# Patient Record
Sex: FEMALE | Race: WHITE | Employment: FULL TIME | URBAN - METROPOLITAN AREA
[De-identification: names, ages, dates, MRNs, and addresses within clinical notes are randomized per-mention and may not be internally consistent; named-entity substitution may affect disease eponyms.]

---

## 2016-11-28 PROCEDURE — 93272 ECG/REVIEW INTERPRET ONLY: CPT | Performed by: INTERNAL MEDICINE

## 2017-01-09 ENCOUNTER — TELEPHONE (OUTPATIENT)
Dept: CARDIOLOGY CLINIC | Age: 22
End: 2017-01-09

## 2017-01-09 ENCOUNTER — TELEPHONE (OUTPATIENT)
Dept: FAMILY MEDICINE CLINIC | Age: 22
End: 2017-01-09

## 2017-01-10 ENCOUNTER — OFFICE VISIT (OUTPATIENT)
Dept: CARDIOLOGY CLINIC | Age: 22
End: 2017-01-10

## 2017-01-10 VITALS
SYSTOLIC BLOOD PRESSURE: 92 MMHG | HEIGHT: 65 IN | DIASTOLIC BLOOD PRESSURE: 58 MMHG | BODY MASS INDEX: 23.17 KG/M2 | WEIGHT: 139.1 LBS | HEART RATE: 64 BPM

## 2017-01-10 DIAGNOSIS — R55 SYNCOPE AND COLLAPSE: Primary | ICD-10-CM

## 2017-01-10 PROCEDURE — 99244 OFF/OP CNSLTJ NEW/EST MOD 40: CPT | Performed by: INTERNAL MEDICINE

## 2017-01-10 PROCEDURE — 93000 ELECTROCARDIOGRAM COMPLETE: CPT | Performed by: INTERNAL MEDICINE

## 2017-01-23 DIAGNOSIS — R55 SYNCOPE AND COLLAPSE: ICD-10-CM

## 2017-01-23 DIAGNOSIS — R42 DIZZINESS: ICD-10-CM

## 2017-05-30 ENCOUNTER — PATIENT MESSAGE (OUTPATIENT)
Dept: DERMATOLOGY | Age: 22
End: 2017-05-30

## 2017-07-12 ENCOUNTER — OFFICE VISIT (OUTPATIENT)
Dept: RHEUMATOLOGY | Age: 22
End: 2017-07-12

## 2017-07-12 ENCOUNTER — OFFICE VISIT (OUTPATIENT)
Dept: DERMATOLOGY | Age: 22
End: 2017-07-12

## 2017-07-12 VITALS
HEIGHT: 65 IN | WEIGHT: 139 LBS | DIASTOLIC BLOOD PRESSURE: 62 MMHG | HEART RATE: 73 BPM | TEMPERATURE: 98.3 F | BODY MASS INDEX: 23.16 KG/M2 | SYSTOLIC BLOOD PRESSURE: 98 MMHG

## 2017-07-12 DIAGNOSIS — L20.84 INTRINSIC ATOPIC DERMATITIS: Primary | ICD-10-CM

## 2017-07-12 DIAGNOSIS — Z13.89 SCREENING FOR RHEUMATIC DISORDER: Primary | ICD-10-CM

## 2017-07-12 DIAGNOSIS — R20.2 PARESTHESIA: ICD-10-CM

## 2017-07-12 PROCEDURE — 99244 OFF/OP CNSLTJ NEW/EST MOD 40: CPT | Performed by: INTERNAL MEDICINE

## 2017-07-12 PROCEDURE — 99213 OFFICE O/P EST LOW 20 MIN: CPT | Performed by: DERMATOLOGY

## 2017-07-12 RX ORDER — FLUOCINONIDE 0.5 MG/G
OINTMENT TOPICAL
Qty: 30 G | Refills: 1 | Status: SHIPPED | OUTPATIENT
Start: 2017-07-12 | End: 2019-01-09

## 2017-07-21 ENCOUNTER — OFFICE VISIT (OUTPATIENT)
Dept: FAMILY MEDICINE CLINIC | Age: 22
End: 2017-07-21

## 2017-07-21 VITALS
RESPIRATION RATE: 18 BRPM | OXYGEN SATURATION: 99 % | HEIGHT: 65 IN | WEIGHT: 140.8 LBS | TEMPERATURE: 98.4 F | BODY MASS INDEX: 23.46 KG/M2 | HEART RATE: 66 BPM | DIASTOLIC BLOOD PRESSURE: 66 MMHG | SYSTOLIC BLOOD PRESSURE: 102 MMHG

## 2017-07-21 DIAGNOSIS — M79.2 NEURALGIA: Primary | ICD-10-CM

## 2017-07-21 DIAGNOSIS — R55 SYNCOPE AND COLLAPSE: ICD-10-CM

## 2017-07-21 PROCEDURE — 99213 OFFICE O/P EST LOW 20 MIN: CPT | Performed by: INTERNAL MEDICINE

## 2017-07-21 ASSESSMENT — PATIENT HEALTH QUESTIONNAIRE - PHQ9
1. LITTLE INTEREST OR PLEASURE IN DOING THINGS: 0
2. FEELING DOWN, DEPRESSED OR HOPELESS: 1
SUM OF ALL RESPONSES TO PHQ QUESTIONS 1-9: 1
SUM OF ALL RESPONSES TO PHQ9 QUESTIONS 1 & 2: 1

## 2017-07-21 ASSESSMENT — ENCOUNTER SYMPTOMS
RESPIRATORY NEGATIVE: 1
ALLERGIC/IMMUNOLOGIC NEGATIVE: 1

## 2017-08-03 ENCOUNTER — OFFICE VISIT (OUTPATIENT)
Dept: FAMILY MEDICINE CLINIC | Age: 22
End: 2017-08-03

## 2017-08-03 VITALS
HEIGHT: 65 IN | DIASTOLIC BLOOD PRESSURE: 78 MMHG | HEART RATE: 68 BPM | OXYGEN SATURATION: 93 % | WEIGHT: 139.2 LBS | RESPIRATION RATE: 18 BRPM | SYSTOLIC BLOOD PRESSURE: 102 MMHG | BODY MASS INDEX: 23.19 KG/M2

## 2017-08-03 DIAGNOSIS — M79.2 NEURALGIA: Primary | ICD-10-CM

## 2017-08-03 DIAGNOSIS — R55 SYNCOPE AND COLLAPSE: ICD-10-CM

## 2017-08-03 PROCEDURE — 99213 OFFICE O/P EST LOW 20 MIN: CPT | Performed by: INTERNAL MEDICINE

## 2017-08-03 ASSESSMENT — ENCOUNTER SYMPTOMS
RESPIRATORY NEGATIVE: 1
ALLERGIC/IMMUNOLOGIC NEGATIVE: 1

## 2019-01-09 ENCOUNTER — OFFICE VISIT (OUTPATIENT)
Dept: FAMILY MEDICINE CLINIC | Age: 24
End: 2019-01-09
Payer: COMMERCIAL

## 2019-01-09 VITALS
BODY MASS INDEX: 23.99 KG/M2 | HEART RATE: 94 BPM | WEIGHT: 144 LBS | TEMPERATURE: 97.9 F | RESPIRATION RATE: 18 BRPM | OXYGEN SATURATION: 99 % | SYSTOLIC BLOOD PRESSURE: 112 MMHG | DIASTOLIC BLOOD PRESSURE: 82 MMHG | HEIGHT: 65 IN

## 2019-01-09 DIAGNOSIS — Z00.00 ANNUAL PHYSICAL EXAM: Primary | ICD-10-CM

## 2019-01-09 DIAGNOSIS — Z13.220 SCREENING, LIPID: ICD-10-CM

## 2019-01-09 DIAGNOSIS — R55 SYNCOPE AND COLLAPSE: ICD-10-CM

## 2019-01-09 DIAGNOSIS — F32.89 OTHER DEPRESSION: ICD-10-CM

## 2019-01-09 DIAGNOSIS — M79.10 MYALGIA: ICD-10-CM

## 2019-01-09 LAB
A/G RATIO: 2.2 (ref 1.1–2.2)
ALBUMIN SERPL-MCNC: 5.3 G/DL (ref 3.4–5)
ALP BLD-CCNC: 65 U/L (ref 40–129)
ALT SERPL-CCNC: 19 U/L (ref 10–40)
ANION GAP SERPL CALCULATED.3IONS-SCNC: 15 MMOL/L (ref 3–16)
AST SERPL-CCNC: 18 U/L (ref 15–37)
BASOPHILS ABSOLUTE: 0 K/UL (ref 0–0.2)
BASOPHILS RELATIVE PERCENT: 0.3 %
BILIRUB SERPL-MCNC: 0.4 MG/DL (ref 0–1)
BUN BLDV-MCNC: 14 MG/DL (ref 7–20)
CALCIUM SERPL-MCNC: 9.9 MG/DL (ref 8.3–10.6)
CHLORIDE BLD-SCNC: 101 MMOL/L (ref 99–110)
CHOLESTEROL, TOTAL: 139 MG/DL (ref 0–199)
CO2: 25 MMOL/L (ref 21–32)
CREAT SERPL-MCNC: 0.6 MG/DL (ref 0.6–1.1)
EOSINOPHILS ABSOLUTE: 0.2 K/UL (ref 0–0.6)
EOSINOPHILS RELATIVE PERCENT: 2.7 %
GFR AFRICAN AMERICAN: >60
GFR NON-AFRICAN AMERICAN: >60
GLOBULIN: 2.4 G/DL
GLUCOSE BLD-MCNC: 84 MG/DL (ref 70–99)
HCT VFR BLD CALC: 45.5 % (ref 36–48)
HDLC SERPL-MCNC: 54 MG/DL (ref 40–60)
HEMOGLOBIN: 15 G/DL (ref 12–16)
LDL CHOLESTEROL CALCULATED: 76 MG/DL
LYMPHOCYTES ABSOLUTE: 2.3 K/UL (ref 1–5.1)
LYMPHOCYTES RELATIVE PERCENT: 28.8 %
MCH RBC QN AUTO: 30.4 PG (ref 26–34)
MCHC RBC AUTO-ENTMCNC: 32.9 G/DL (ref 31–36)
MCV RBC AUTO: 92.5 FL (ref 80–100)
MONOCYTES ABSOLUTE: 0.7 K/UL (ref 0–1.3)
MONOCYTES RELATIVE PERCENT: 8.5 %
NEUTROPHILS ABSOLUTE: 4.7 K/UL (ref 1.7–7.7)
NEUTROPHILS RELATIVE PERCENT: 59.7 %
PDW BLD-RTO: 12.7 % (ref 12.4–15.4)
PLATELET # BLD: 245 K/UL (ref 135–450)
PMV BLD AUTO: 9.5 FL (ref 5–10.5)
POTASSIUM SERPL-SCNC: 3.9 MMOL/L (ref 3.5–5.1)
RBC # BLD: 4.92 M/UL (ref 4–5.2)
SODIUM BLD-SCNC: 141 MMOL/L (ref 136–145)
TOTAL PROTEIN: 7.7 G/DL (ref 6.4–8.2)
TRIGL SERPL-MCNC: 43 MG/DL (ref 0–150)
TSH REFLEX FT4: 2.86 UIU/ML (ref 0.27–4.2)
VLDLC SERPL CALC-MCNC: 9 MG/DL
WBC # BLD: 7.9 K/UL (ref 4–11)

## 2019-01-09 PROCEDURE — 99395 PREV VISIT EST AGE 18-39: CPT | Performed by: PHYSICIAN ASSISTANT

## 2019-01-09 PROCEDURE — 36415 COLL VENOUS BLD VENIPUNCTURE: CPT | Performed by: PHYSICIAN ASSISTANT

## 2019-01-09 PROCEDURE — 90686 IIV4 VACC NO PRSV 0.5 ML IM: CPT | Performed by: PHYSICIAN ASSISTANT

## 2019-01-09 PROCEDURE — 90471 IMMUNIZATION ADMIN: CPT | Performed by: PHYSICIAN ASSISTANT

## 2019-01-09 ASSESSMENT — PATIENT HEALTH QUESTIONNAIRE - PHQ9
SUM OF ALL RESPONSES TO PHQ QUESTIONS 1-9: 1
SUM OF ALL RESPONSES TO PHQ9 QUESTIONS 1 & 2: 1
2. FEELING DOWN, DEPRESSED OR HOPELESS: 0
1. LITTLE INTEREST OR PLEASURE IN DOING THINGS: 1
SUM OF ALL RESPONSES TO PHQ QUESTIONS 1-9: 1

## 2020-01-27 ENCOUNTER — OFFICE VISIT (OUTPATIENT)
Dept: DERMATOLOGY | Age: 25
End: 2020-01-27
Payer: COMMERCIAL

## 2020-01-27 PROCEDURE — 99213 OFFICE O/P EST LOW 20 MIN: CPT | Performed by: DERMATOLOGY

## 2020-01-27 PROCEDURE — 11104 PUNCH BX SKIN SINGLE LESION: CPT | Performed by: DERMATOLOGY

## 2020-01-27 PROCEDURE — 11103 TANGNTL BX SKIN EA SEP/ADDL: CPT | Performed by: DERMATOLOGY

## 2020-01-27 RX ORDER — FLUOCINONIDE 0.5 MG/G
OINTMENT TOPICAL
Qty: 30 G | Refills: 1 | Status: SHIPPED | OUTPATIENT
Start: 2020-01-27 | End: 2020-05-28 | Stop reason: SDUPTHER

## 2020-01-27 RX ORDER — FLUOCINONIDE 0.5 MG/G
OINTMENT TOPICAL
Qty: 30 G | Refills: 1 | Status: SHIPPED | OUTPATIENT
Start: 2020-01-27 | End: 2020-01-27 | Stop reason: SDUPTHER

## 2020-01-27 NOTE — PROGRESS NOTES
extending slightly to labia majora - thinly scaling faint pink patches   -Both proximal extensor forearms - few cm well-defined focally excoriated thinly coarsely scaling erythematous plaques     2. Scattered on the trunk and extremities are multiple well-defined round and oval symmetric smoothly-bordered uniformly brown macules and papules. 2a. Mid anterior R lower leg - 4mm centrally tan o/w dark brown thin papule       Assessment and Plan     1. Recurrent pruritic scaling eruption of extremities and genitalia. Previously assessed as dermatitis, however morphology of forearm lesions is more consistent w/ psoriasis.   -Discussed possible diagnosis. Patient agreeable to biopsy. Verbal consent obtained after risks (infection, bleeding, scar), benefits and alternatives explained. -Area(s) to be biopsied were marked with a surgical pen. Site(s) were cleansed with alcohol. Local anesthesia achieved with 1% lidocaine with epinephrine/sodium bicarbonate. 4mm punch biopsy of R elbow lesion was performed followed by placement of simple interrupted 4-0 nylon sutures. Specimen(s) sent to pathology. The wound(s) were dressed with petrolatum and covered with a bandage. Pt was educated re: risk of bleeding, infection, scar, wound care instructions and suture removal.   -Lidex oint bid prn. Edu re: sparing use, atrophy, striae, hypopigmentation, telangiectasias. 2. Benign acquired melanocytic nevi  -Recommend monthly self skin exams   -Educated regarding the ABCDEs of melanoma detection   -Call for any new/changing moles or concerning lesions  -Reviewed sun protective behavior -- sun avoidance during the peak hours of the day, sun-protective clothing (including hat and sunglasses), sunscreen use (water resistant, broad spectrum, SPF at least 30, need for reapplication every 2 to 3 hours), avoidance of tanning beds   -Return for full skin exam in 1 year (sooner if indicated)     2a.  Neoplasm of uncertain behavior of skin -

## 2020-01-27 NOTE — PATIENT INSTRUCTIONS
Protecting Yourself From the Sun    · Apply broad spectrum water resistant sunscreen with an SPF of at least 30 to exposed areas of the skin. Dont forget the ears and lips! Remember to reapply sunscreen about every 2 hours and after swimming or sweating. · Wear sun protective clothing. Swim shirts (aka. rash guards) are a great idea and negates the need to reapply sunscreen in those areas. · Seek the shade whenever possible especially between the hours of 10 am and 4 pm when the suns rays are the strongest.     · Avoid tanning beds       Biopsy Wound Care Instructions    · Keep the bandage in place for 24 hours. · Cleanse the wound with mild soapy water daily   Gently dry the area.  Apply Vaseline or petroleum jelly to the wound using a cotton tipped applicator.  Cover with a clean bandage.  Repeat this process until the biopsy site is healed.  If you had stitches placed, continue treating the site until the stitches are removed. Remember to make an appointment to return to have your stitches removed by our staff.  You may shower and bathe as usual.       ** Biopsy results generally take around 7 business days to come back. If you have not heard from us by then, please call the office at [phone] between 8AM and 4PM Monday through Friday.        ** REMOVE STITCH(ES) IN 14 DAYS **

## 2020-01-29 LAB — DERMATOLOGY PATHOLOGY REPORT: NORMAL

## 2020-02-07 NOTE — PROGRESS NOTES
affect, memory, mentation, or behavior are noted      DATA:    EC/10/2020  SR 79 bpm  ECHO: 3/27/14  Summary   Left ventricle size is normal.   Normal left ventricular wall thickness. Global ejection fraction is normal and estimated from 55 % to 60 %. No regional wall motion abnormalities are noted. Normal diastolic function. Mitral valve is structurally normal.   Trivial mitral regurgitation is present. The aortic valve is normal in structure and function. There is no   significant aortic regurgitation. IVC is normal in size (< 2.1 cm) and collapses > 50% with respiration   consistent with normal RA pressure (3 mmHg). no intracardiac mass, vegetations or thrombi. IMPRESSION:    1. Dizziness   -At least once/month  2. Syncope  ` ~resolved  3. Lower extremity numbness   -ongoing    RECOMMENDATIONS:  1.  ESR, C-reactive Protein, RON to r/o rheumatological reason for her symptoms   2.  2 week monitor to r/ cardiac arrhythmias  3. Keep large glass of water at bedside and drink before you get out of bed. 4.  Referral to Dr. Alonso Ellis  5. Keep log of BP and HR and bring back with you. 6.  Echo  7. Follow up after see Dr. Alonso Ellis in about 6-8 weeks      QUALITY MEASURES  1. Tobacco Cessation Counseling: NA  2. Retake of BP if >140/90:   NA  3. Documentation to PCP/referring for new patient:  Sent to PCP at close of office visit  4. CAD patient on anti-platelet: NA  5. CAD patient on STATIN therapy:  NA  6. Patient with CHF and aFib on anticoagulation:  NA       This note was scribed in the presence of BRII Morris MD by Choco Cardoso RN.     I, Luciano Gary, personally performed the services described in this documentation as scribed by the above signed scribe in my presence, and it is both accurate and complete to the best of our ability and knowledge.  I agree with the details independently gathered by my clinical support staff, while the remaining scribed note accurately describes my personal

## 2020-02-10 ENCOUNTER — HOSPITAL ENCOUNTER (OUTPATIENT)
Age: 25
Discharge: HOME | End: 2020-02-10
Payer: COMMERCIAL

## 2020-02-10 ENCOUNTER — OFFICE VISIT (OUTPATIENT)
Dept: CARDIOLOGY CLINIC | Age: 25
End: 2020-02-10
Payer: COMMERCIAL

## 2020-02-10 VITALS
WEIGHT: 158 LBS | SYSTOLIC BLOOD PRESSURE: 126 MMHG | DIASTOLIC BLOOD PRESSURE: 78 MMHG | OXYGEN SATURATION: 99 % | HEART RATE: 88 BPM | BODY MASS INDEX: 26.33 KG/M2 | HEIGHT: 65 IN

## 2020-02-10 LAB
C-REACTIVE PROTEIN: 3.3 MG/L (ref 0–5.1)
SEDIMENTATION RATE, ERYTHROCYTE: 5 MM/HR (ref 0–20)

## 2020-02-10 PROCEDURE — 99244 OFF/OP CNSLTJ NEW/EST MOD 40: CPT | Performed by: INTERNAL MEDICINE

## 2020-02-10 PROCEDURE — 85652 RBC SED RATE AUTOMATED: CPT

## 2020-02-10 PROCEDURE — 93000 ELECTROCARDIOGRAM COMPLETE: CPT | Performed by: INTERNAL MEDICINE

## 2020-02-10 PROCEDURE — 86140 C-REACTIVE PROTEIN: CPT

## 2020-02-10 PROCEDURE — 36415 COLL VENOUS BLD VENIPUNCTURE: CPT

## 2020-02-10 PROCEDURE — 86038 ANTINUCLEAR ANTIBODIES: CPT

## 2020-02-11 LAB — ANTI-NUCLEAR ANTIBODY (ANA): NEGATIVE

## 2020-02-12 ENCOUNTER — TELEPHONE (OUTPATIENT)
Dept: CARDIOLOGY CLINIC | Age: 25
End: 2020-02-12

## 2020-02-19 ENCOUNTER — TELEPHONE (OUTPATIENT)
Dept: CARDIOLOGY CLINIC | Age: 25
End: 2020-02-19

## 2020-02-19 ENCOUNTER — HOSPITAL ENCOUNTER (OUTPATIENT)
Dept: CARDIOLOGY | Age: 25
Discharge: HOME | End: 2020-02-19
Payer: COMMERCIAL

## 2020-02-19 LAB
LV EF: 55 %
LVEF MODALITY: NORMAL

## 2020-02-19 PROCEDURE — 93306 TTE W/DOPPLER COMPLETE: CPT

## 2020-02-19 PROCEDURE — 0296T PR EXT ECG > 48HR TO 21 DAY RCRD W/CONECT INTL RCRD: CPT | Performed by: INTERNAL MEDICINE

## 2020-02-24 ENCOUNTER — TELEPHONE (OUTPATIENT)
Dept: CARDIOLOGY CLINIC | Age: 25
End: 2020-02-24

## 2020-02-24 NOTE — TELEPHONE ENCOUNTER
----- Message from Duane Glass MD sent at 2/24/2020 12:05 PM EST -----  Please inform patient.  Echo normal

## 2020-02-27 ENCOUNTER — HOSPITAL ENCOUNTER (OUTPATIENT)
Age: 25
Discharge: HOME | End: 2020-02-27
Payer: COMMERCIAL

## 2020-02-27 LAB
A/G RATIO: 1.9 (ref 1.1–2.2)
ALBUMIN SERPL-MCNC: 5 G/DL (ref 3.4–5)
ALP BLD-CCNC: 56 U/L (ref 40–129)
ALT SERPL-CCNC: 23 U/L (ref 10–40)
ANION GAP SERPL CALCULATED.3IONS-SCNC: 14 MMOL/L (ref 3–16)
AST SERPL-CCNC: 20 U/L (ref 15–37)
BASOPHILS ABSOLUTE: 0 K/UL (ref 0–0.2)
BASOPHILS RELATIVE PERCENT: 0.5 %
BILIRUB SERPL-MCNC: 0.5 MG/DL (ref 0–1)
BUN BLDV-MCNC: 12 MG/DL (ref 7–20)
CALCIUM SERPL-MCNC: 9.7 MG/DL (ref 8.3–10.6)
CHLORIDE BLD-SCNC: 98 MMOL/L (ref 99–110)
CO2: 26 MMOL/L (ref 21–32)
CREAT SERPL-MCNC: 0.5 MG/DL (ref 0.6–1.1)
EOSINOPHILS ABSOLUTE: 0.2 K/UL (ref 0–0.6)
EOSINOPHILS RELATIVE PERCENT: 2.7 %
GFR AFRICAN AMERICAN: >60
GFR NON-AFRICAN AMERICAN: >60
GLOBULIN: 2.7 G/DL
GLUCOSE BLD-MCNC: 79 MG/DL (ref 70–99)
HCT VFR BLD CALC: 41.5 % (ref 36–48)
HEMOGLOBIN: 14 G/DL (ref 12–16)
LYMPHOCYTES ABSOLUTE: 2 K/UL (ref 1–5.1)
LYMPHOCYTES RELATIVE PERCENT: 24 %
MCH RBC QN AUTO: 31 PG (ref 26–34)
MCHC RBC AUTO-ENTMCNC: 33.8 G/DL (ref 31–36)
MCV RBC AUTO: 91.7 FL (ref 80–100)
MONOCYTES ABSOLUTE: 0.7 K/UL (ref 0–1.3)
MONOCYTES RELATIVE PERCENT: 8 %
NEUTROPHILS ABSOLUTE: 5.5 K/UL (ref 1.7–7.7)
NEUTROPHILS RELATIVE PERCENT: 64.8 %
PDW BLD-RTO: 12.7 % (ref 12.4–15.4)
PLATELET # BLD: 234 K/UL (ref 135–450)
PMV BLD AUTO: 9.4 FL (ref 5–10.5)
POTASSIUM SERPL-SCNC: 3.7 MMOL/L (ref 3.5–5.1)
RBC # BLD: 4.53 M/UL (ref 4–5.2)
RHEUMATOID FACTOR: <10 IU/ML
SODIUM BLD-SCNC: 138 MMOL/L (ref 136–145)
TOTAL CK: 83 U/L (ref 26–192)
TOTAL PROTEIN: 7.7 G/DL (ref 6.4–8.2)
TSH REFLEX FT4: 1.89 UIU/ML (ref 0.27–4.2)
WBC # BLD: 8.5 K/UL (ref 4–11)

## 2020-02-27 PROCEDURE — 36415 COLL VENOUS BLD VENIPUNCTURE: CPT

## 2020-02-27 PROCEDURE — 80053 COMPREHEN METABOLIC PANEL: CPT

## 2020-02-27 PROCEDURE — 82164 ANGIOTENSIN I ENZYME TEST: CPT

## 2020-02-27 PROCEDURE — 85025 COMPLETE CBC W/AUTO DIFF WBC: CPT

## 2020-02-27 PROCEDURE — 86255 FLUORESCENT ANTIBODY SCREEN: CPT

## 2020-02-27 PROCEDURE — 86431 RHEUMATOID FACTOR QUANT: CPT

## 2020-02-27 PROCEDURE — 82550 ASSAY OF CK (CPK): CPT

## 2020-02-27 PROCEDURE — 84443 ASSAY THYROID STIM HORMONE: CPT

## 2020-02-29 LAB
ANCA IFA: NORMAL
ANGIOTENSIN CONVERTING ENZYME: 41 U/L (ref 9–67)

## 2020-03-20 ENCOUNTER — HOSPITAL ENCOUNTER (OUTPATIENT)
Age: 25
Discharge: HOME | End: 2020-03-20
Payer: COMMERCIAL

## 2020-03-20 LAB
HEPATITIS B CORE IGM ANTIBODY: NORMAL
HEPATITIS B SURFACE ANTIGEN INTERPRETATION: NORMAL
HEPATITIS C ANTIBODY INTERPRETATION: NORMAL

## 2020-03-20 PROCEDURE — 86803 HEPATITIS C AB TEST: CPT

## 2020-03-20 PROCEDURE — 84165 PROTEIN E-PHORESIS SERUM: CPT

## 2020-03-20 PROCEDURE — 84207 ASSAY OF VITAMIN B-6: CPT

## 2020-03-20 PROCEDURE — 86235 NUCLEAR ANTIGEN ANTIBODY: CPT

## 2020-03-20 PROCEDURE — 86701 HIV-1ANTIBODY: CPT

## 2020-03-20 PROCEDURE — 82175 ASSAY OF ARSENIC: CPT

## 2020-03-20 PROCEDURE — 86705 HEP B CORE ANTIBODY IGM: CPT

## 2020-03-20 PROCEDURE — 83519 RIA NONANTIBODY: CPT

## 2020-03-20 PROCEDURE — 83516 IMMUNOASSAY NONANTIBODY: CPT

## 2020-03-20 PROCEDURE — 36415 COLL VENOUS BLD VENIPUNCTURE: CPT

## 2020-03-20 PROCEDURE — 87390 HIV-1 AG IA: CPT

## 2020-03-20 PROCEDURE — 87340 HEPATITIS B SURFACE AG IA: CPT

## 2020-03-20 PROCEDURE — 86702 HIV-2 ANTIBODY: CPT

## 2020-03-20 PROCEDURE — 83825 ASSAY OF MERCURY: CPT

## 2020-03-20 PROCEDURE — 83655 ASSAY OF LEAD: CPT

## 2020-03-20 PROCEDURE — 84155 ASSAY OF PROTEIN SERUM: CPT

## 2020-03-21 LAB
ANTI-SS-A IGG: <0.2 AI (ref 0–0.9)
ANTI-SS-B IGG: <0.2 AI (ref 0–0.9)
HIV AG/AB: NORMAL
HIV ANTIGEN: NORMAL
HIV-1 ANTIBODY: NORMAL
HIV-2 AB: NORMAL

## 2020-03-22 LAB
ARSENIC BLOOD: <10 UG/L (ref 0–12)
LEAD LEVEL BLOOD: <2 UG/DL (ref 0–4.9)
MERCURY BLOOD: <2.5 UG/L (ref 0–10)

## 2020-03-23 ENCOUNTER — TELEPHONE (OUTPATIENT)
Dept: CARDIOLOGY CLINIC | Age: 25
End: 2020-03-23

## 2020-03-23 LAB
ALBUMIN SERPL-MCNC: 4.1 G/DL (ref 3.1–4.9)
ALPHA-1-GLOBULIN: 0.3 G/DL (ref 0.2–0.4)
ALPHA-2-GLOBULIN: 0.9 G/DL (ref 0.4–1.1)
BETA GLOBULIN: 1.1 G/DL (ref 0.9–1.6)
GAMMA GLOBULIN: 1.3 G/DL (ref 0.6–1.8)
MISCELLANEOUS LAB TEST ORDER: NORMAL
SPE/IFE INTERPRETATION: NORMAL
TOTAL PROTEIN: 7.7 G/DL (ref 6.4–8.2)
VITAMIN B6: 104.7 NMOL/L (ref 20–125)

## 2020-03-23 PROCEDURE — 0298T PR EXT ECG > 48HR TO 21 DAY REVIEW AND INTERPRETATN: CPT | Performed by: INTERNAL MEDICINE

## 2020-03-23 NOTE — TELEPHONE ENCOUNTER
Monitor most consistent inappropriate sinus tachycardia based on our discussion. I don't feel comfortable starting her on therapy without an exam at this point. She is following up with her neurologist and will follow up in future if she continues to be symptomatic.

## 2020-03-25 ENCOUNTER — TELEPHONE (OUTPATIENT)
Dept: CARDIOLOGY CLINIC | Age: 25
End: 2020-03-25

## 2020-04-06 LAB
Lab: NORMAL
REPORT: NORMAL
THIS TEST SENT TO: NORMAL

## 2020-04-29 NOTE — PROGRESS NOTES
[facility]   Electrophysiology Note      Reason for office visit: Follow-up; Palpitations; Shortness of Breath (on exertion); and Dizziness      HISTORY OF PRESENT ILLNESS: Emily Hickey is a 26 y.o. female who presents for follow up for dizziness. She had seen Dr. Giuliana Lay on 1/10/2017 for syncope and her metoprolol was cut back to 12.5mg BID. She then saw Dr. Dona Huang on 7/21/17 and he stopped the metoprolol and she reported that her symptoms resolved 3-4 days. She had a normal echo on 2/24/2020. Her cardiac event monitor from 3/2020 was consistent with inappropriate sinus tachycardia. She is following Dr. Greg Helton for Raynaud phenomenon. She underwent a tilt table test on 3/21/2020. Today she states that her neurologist diagnosed her with small fiber neuropathy. EKG today shows SR 80 bpm.  She likes to hike, write, and photography. She feels fatigue, leg numbness, feeling like she is going to pass out, and nausea with her neuropathy. She was diagnosed with POTS when she was 17. She gets dizzy when she gets up from lying or sitting. She gets this multiple times a week. She states that she has a little bit of depression but it is controlled and she is not taking anything for it. She denies smoking or illicit drugs. She does drink alcohol occasionally. Past Medical History:   has a past medical history of Depression, Syncope and collapse, and Syncope and collapse. Past Surgical History:   has a past surgical history that includes [place] tooth extraction (07/2016). Social History:   reports that she has never smoked. She has never used smokeless tobacco. She reports current alcohol use. She reports that she does not use drugs.      Family History: family history includes Asthma in her brother and father; Diabetes in her paternal grandfather; High Blood Pressure in her maternal grandfather and maternal grandmother; High Cholesterol in her maternal grandfather and maternal No regional wall motion abnormalities are seen. Normal left ventricular diastolic filling pressure. No significant valvular heart disease noted. IMPRESSION:    1. Sinus tachycardia  She is a pleasant 60-year-old female with a medical history significant for symptomatic supraventricular tachycardia. She previously had seen Dr. Celestino Ellsworth and was diagnosed with POTS at age 13 however her tilt table test was negative. She has had symptoms with metoprolol including numbness in her legs. Her new showed sinus tachycardia. She states that during these episodes she is actually not active. We discussed ablation versus medical therapy. I explained to her that ablation for these tachycardias is extremely difficult if her true diagnosis is true and true inappropriate sinus tachycardia I would recommend highly that we start with medications and start with a beta-blocker. Although she has had issues with metoprolol in the past but I will start her on propranolol which may help with her anxiety.  - Start metoprolol 80 mg daily.  - Follow up in 3-4 weeks with virtual visit or telephone to see how she's doing with medication. 2.  Dizziness  - Plan as per above. RECOMMENDATIONS:  1. Ablation benifis and risks discussed. 2.  Propanolol can cause insomnia and decreased libido  3. Start propanolol LA 80 mg daily  4. Follow up in 3-4 weeks with a virtual visit. Call sooner if any problems with medication    QUALITY MEASURES  1. Tobacco Cessation Counseling: NA  2. Retake of BP if >140/90:   NA  3. Documentation to PCP/referring for new patient:  Sent to PCP at close of office visit  4. CAD patient on anti-platelet: NA  5. CAD patient on STATIN therapy:  NA  6. Patient with CHF and aFib on anticoagulation:  NA     All questions and concerns were addressed to the patient/family. Alternatives to my treatment were discussed. This note was scribed in the presence of Sneha Cuevas MD by Miguelito Boyle RN.       Isma Ruano MD  Electrophysiology  [address] [facility]. [address]. [place] [address]  Phone: [phone]  Fax: [phone]   5/5/2020     NOTE: This report was transcribed using voice recognition software. Every effort was made to ensure accuracy, however, inadvertent computerized transcription errors may be present. The scribe's documentation has been prepared under my direction and personally reviewed by me in its entirety. I confirm that the note above accurately reflects all work, physical examination, the discussion of treatments and procedures, and medical decision making performed by me. Ellen Jarrett MD personally performed the services described in this documentation as scribed by nurse in my presence, and is both accurate and complete.     Electronically signed by Princess Tavon MD on 5/5/2020 at 5:05 PM

## 2020-05-01 ENCOUNTER — TELEPHONE (OUTPATIENT)
Dept: CARDIOLOGY CLINIC | Age: 25
End: 2020-05-01

## 2020-05-01 RX ORDER — GABAPENTIN 100 MG/1
100 CAPSULE ORAL PRN
COMMUNITY

## 2020-05-05 ENCOUNTER — TELEPHONE (OUTPATIENT)
Dept: CARDIOLOGY CLINIC | Age: 25
End: 2020-05-05

## 2020-05-05 ENCOUNTER — OFFICE VISIT (OUTPATIENT)
Dept: CARDIOLOGY CLINIC | Age: 25
End: 2020-05-05
Payer: COMMERCIAL

## 2020-05-05 VITALS
WEIGHT: 156 LBS | BODY MASS INDEX: 25.99 KG/M2 | DIASTOLIC BLOOD PRESSURE: 60 MMHG | OXYGEN SATURATION: 99 % | HEART RATE: 87 BPM | HEIGHT: 65 IN | SYSTOLIC BLOOD PRESSURE: 94 MMHG

## 2020-05-05 PROCEDURE — 93000 ELECTROCARDIOGRAM COMPLETE: CPT | Performed by: INTERNAL MEDICINE

## 2020-05-05 PROCEDURE — 99214 OFFICE O/P EST MOD 30 MIN: CPT | Performed by: INTERNAL MEDICINE

## 2020-05-05 RX ORDER — PROPRANOLOL HYDROCHLORIDE 80 MG/1
80 CAPSULE, EXTENDED RELEASE ORAL DAILY
Qty: 30 CAPSULE | Refills: 5 | Status: SHIPPED | OUTPATIENT
Start: 2020-05-05 | End: 2020-06-16 | Stop reason: SDUPTHER

## 2020-05-28 ENCOUNTER — VIRTUAL VISIT (OUTPATIENT)
Dept: DERMATOLOGY | Age: 25
End: 2020-05-28
Payer: COMMERCIAL

## 2020-05-28 PROCEDURE — 99213 OFFICE O/P EST LOW 20 MIN: CPT | Performed by: DERMATOLOGY

## 2020-05-28 RX ORDER — PIMECROLIMUS 10 MG/G
CREAM TOPICAL
Qty: 30 G | Refills: 1 | Status: SHIPPED | OUTPATIENT
Start: 2020-05-28

## 2020-05-28 RX ORDER — FLUOCINONIDE 0.5 MG/G
OINTMENT TOPICAL
Qty: 30 G | Refills: 1 | Status: SHIPPED | OUTPATIENT
Start: 2020-05-28 | End: 2020-11-23 | Stop reason: SDUPTHER

## 2020-06-15 NOTE — PROGRESS NOTES
2020    TELEHEALTH EVALUATION -- Audio (During VNSYT-47 public health emergency)    HPI: Fercho Rodriguez is a 26 y.o. female who presents for follow up for dizziness. She had seen Dr. Viviana Rudolph on 1/10/2017 for syncope and her metoprolol was cut back to 12.5mg BID. She then saw Dr. Beverley Gomez on 17 and he stopped the metoprolol and she reported that her symptoms resolved 3-4 days. She had a normal echo on 2020. Her cardiac event monitor from 3/2020 was consistent with inappropriate sinus tachycardia. She is following Dr. Spenser Cameron for Raynaud phenomenon. She underwent a tilt table test on 3/21/2020. Fercho Rodriguez (:  1995) has requested an audio evaluation for the following concern(s):    Today she states that she has been doing good. She has been exercising daily. She states the Inderal is helping tremendously. Patient denies chest pain, sob, palpitations, dizziness or syncope. She is taking it as prescribed and tolerating it well. Review of Systems    Prior to Visit Medications    Medication Sig Taking? Authorizing Provider   pimecrolimus (ELIDEL) 1 % cream Apply to affected \"sensitive\" areas bid prn flares. Yes Gill Anthony MD   fluocinonide (LIDEX) 0.05 % ointment Apply sparingly to affected area(s) bid prn for flares, up to 2 weeks at a time on any given area. Do not apply on cleared skin. Yes Gill Anthony MD   propranolol (INDERAL LA) 80 MG extended release capsule Take 1 capsule by mouth daily Yes Tian Addison MD   gabapentin (NEURONTIN) 100 MG capsule Take 100 mg by mouth 3 times daily.  prn Yes Historical Provider, MD   levonorgestrel (MIRENA) 20 MCG/24HR IUD by Intrauterine route Yes Historical Provider, MD       Social History     Tobacco Use    Smoking status: Never Smoker    Smokeless tobacco: Never Used   Substance Use Topics    Alcohol use: Yes     Comment: on weekends    Drug use: No        Past Medical History:   Diagnosis Date    Depression     sees therapist 2x per month (1/2019)    Syncope and collapse     Syncope and collapse      ASSESSMENT/PLAN:  1. Sinus tachycardia (inappropriate). 05/05/2020  She is a pleasant 30-year-old female with a medical history significant for symptomatic supraventricular tachycardia. She previously had seen Dr. Patricia Molina and was diagnosed with POTS at age 13 however her tilt table test was negative. She has had symptoms with metoprolol including numbness in her legs. Her new showed sinus tachycardia. She states that during these episodes she is actually not active. We discussed ablation versus medical therapy. I explained to her that ablation for these tachycardias is extremely difficult if her true diagnosis is true and true inappropriate sinus tachycardia I would recommend highly that we start with medications and start with a beta-blocker. Although she has had issues with metoprolol in the past but I will start her on propranolol which may help with her anxiety. 06/16/2020  Patient doing well. Tolerating the metoprolol well. She is exercising again and feels that her energy has significantly improved. She is no longer short of breath or dizzy. We will plan on continuing her current course. Have her follow-up wi NtP in 1 year or as needed. Questions concerns addressed. - Continue propanolol.  - Follow up in 1 year or PRN with me or NP.     2.  Dizziness  - Plan as per above. Natalie Kaur is a 26 y.o. female being evaluated by a telephone Visit  encounter to address concerns as mentioned above. A caregiver was present when appropriate. Due to this being a TeleHealth encounter (During [place] [address] public health emergency), evaluation of the following organ systems was limited: Vitals/Constitutional/EENT/Resp/CV/GI//MS/Neuro/Skin/Heme-Lymph-Imm.   Pursuant to the emergency declaration under the [address] [facility] [place], [address] waiver authority and the [employer]

## 2020-06-16 ENCOUNTER — VIRTUAL VISIT (OUTPATIENT)
Dept: CARDIOLOGY CLINIC | Age: 25
End: 2020-06-16
Payer: COMMERCIAL

## 2020-06-16 PROCEDURE — 99442 PR PHYS/QHP TELEPHONE EVALUATION 11-20 MIN: CPT | Performed by: INTERNAL MEDICINE

## 2020-06-16 RX ORDER — PROPRANOLOL HYDROCHLORIDE 80 MG/1
80 CAPSULE, EXTENDED RELEASE ORAL DAILY
Qty: 90 CAPSULE | Refills: 3 | Status: SHIPPED | OUTPATIENT
Start: 2020-06-16 | End: 2020-11-20 | Stop reason: SDUPTHER

## 2020-08-03 ENCOUNTER — OFFICE VISIT (OUTPATIENT)
Dept: ORTHOPEDIC SURGERY | Age: 25
End: 2020-08-03
Payer: COMMERCIAL

## 2020-08-03 VITALS — BODY MASS INDEX: 24.99 KG/M2 | HEIGHT: 65 IN | WEIGHT: 150 LBS

## 2020-08-03 PROCEDURE — 99243 OFF/OP CNSLTJ NEW/EST LOW 30: CPT | Performed by: ORTHOPAEDIC SURGERY

## 2020-08-03 NOTE — PROGRESS NOTES
Chief Complaint    Hip Pain (bilat hip snapping for along time, while hiking hip snapped and causing her knee to hurt, catching)      History of Present Illness:  Marti Rhodes is a 26 y.o. female presents with acute on chronic left lateral hip pain and right knee pain. She has had what she describes a snapping over the lateral hip which she states that her hip is dislocating. Is been happening for the last several years. She had an episode while she was hiking where she could not get it back in. She also has pain on the lateral aspect of her right knee. She has no specific injury on this area but hurts going up and down stairs, getting up out of a seated squatted position. She saw her primary care physician, Dr. Ross Quick who sends her in today for orthopedic consultation.      Pain Assessment  Location of Pain: Knee  Location Modifiers: Right  Severity of Pain: 8  Frequency of Pain: Intermittent  Aggravating Factors: Stairs  Limiting Behavior: Some  Result of Injury: No  Work-Related Injury: No  Are there other pain locations you wish to document?: No]      Medical History:  Past Medical History:   Diagnosis Date    Depression     sees therapist 2x per month (1/2019)    Syncope and collapse     Syncope and collapse      Patient Active Problem List    Diagnosis Date Noted    Neuralgia 07/21/2017    Strep pharyngitis 12/16/2015    Mononucleosis 12/16/2015    Sore throat and laryngitis 11/28/2014    Fatigue 03/21/2014    Dizziness 03/12/2014    Syncope and collapse 03/10/2014     Past Surgical History:   Procedure Laterality Date    WISDOM TOOTH EXTRACTION  07/2016     Family History   Problem Relation Age of Onset    Other Mother     Rheumatologic Disease Mother     Mental Illness Father     Asthma Father     High Cholesterol Maternal Grandfather     High Blood Pressure Maternal Grandfather     Other Paternal Uncle     Asthma Brother     High Blood Pressure Maternal Grandmother     High Cholesterol Maternal Grandmother     Rheum Arthritis Maternal Grandmother     Diabetes Paternal Grandfather      Social History     Socioeconomic History    Marital status: Single     Spouse name: None    Number of children: None    Years of education: None    Highest education level: None   Occupational History    Occupation: advertising   Social Needs    Financial resource strain: None    Food insecurity     Worry: None     Inability: None    Transportation needs     Medical: None     Non-medical: None   Tobacco Use    Smoking status: Never Smoker    Smokeless tobacco: Never Used   Substance and Sexual Activity    Alcohol use: Yes     Comment: on weekends    Drug use: No    Sexual activity: None   Lifestyle    Physical activity     Days per week: None     Minutes per session: None    Stress: None   Relationships    Social connections     Talks on phone: None     Gets together: None     Attends Hinduism service: None     Active member of club or organization: None     Attends meetings of clubs or organizations: None     Relationship status: None    Intimate partner violence     Fear of current or ex partner: None     Emotionally abused: None     Physically abused: None     Forced sexual activity: None   Other Topics Concern    None   Social History Narrative    None     Current Outpatient Medications   Medication Sig Dispense Refill    propranolol (INDERAL LA) 80 MG extended release capsule Take 1 capsule by mouth daily 90 capsule 3    pimecrolimus (ELIDEL) 1 % cream Apply to affected \"sensitive\" areas bid prn flares. 30 g 1    fluocinonide (LIDEX) 0.05 % ointment Apply sparingly to affected area(s) bid prn for flares, up to 2 weeks at a time on any given area. Do not apply on cleared skin. 30 g 1    gabapentin (NEURONTIN) 100 MG capsule Take 100 mg by mouth 3 times daily.  prn      levonorgestrel (MIRENA) 20 MCG/24HR IUD by Intrauterine route       No current facility-administered medications for this visit. Current Outpatient Medications on File Prior to Visit   Medication Sig Dispense Refill    propranolol (INDERAL LA) 80 MG extended release capsule Take 1 capsule by mouth daily 90 capsule 3    pimecrolimus (ELIDEL) 1 % cream Apply to affected \"sensitive\" areas bid prn flares. 30 g 1    fluocinonide (LIDEX) 0.05 % ointment Apply sparingly to affected area(s) bid prn for flares, up to 2 weeks at a time on any given area. Do not apply on cleared skin. 30 g 1    gabapentin (NEURONTIN) 100 MG capsule Take 100 mg by mouth 3 times daily. prn      levonorgestrel (MIRENA) 20 MCG/24HR IUD by Intrauterine route       No current facility-administered medications on file prior to visit. Allergies   Allergen Reactions    Amoxicillin     Eucrisa [Crisaborole] Other (See Comments)     Caused severe burning reaction.  Metoprolol      Feet felt like they were burning       Review of Systems:  Relevant review of systems reviewed and available in the patient's chart    Vital Signs: There were no vitals filed for this visit. General Exam:   Constitutional: Patient is adequately groomed with no evidence of malnutrition  Mental Status: The patient is oriented to time, place and person. The patient's mood and affect are appropriate. Lymphatic: The lymphatic examination bilaterally reveals all areas to be without enlargement or induration. Vascular: Examination reveals no swelling or calf tenderness. Peripheral pulses are palpable and 2+. Neurological: The patient has good coordination. There is no weakness or sensory deficit.     Left hip Examination:    Inspection:  No erythema swelling or signs of infection    Palpation:  Tenderness to palpation of the lateral aspect over the greater trochanter    Range of Motion:  Full range of motion but does have some discomfort laterally at the hip    Strength:  5/5 hip flexion and abduction and adduction    Special Tests:  Positive Khalif's test. Negative logroll maneuver    Skin: There are no rashes, ulcerations or lesions. Gait: Normal    Reflex 2+ patellar    Additional Comments:       Additional Examinations:         Right Lower Extremity: Examination of the right lower extremity does not show any tenderness, deformity or injury. Range of motion is unremarkable. There is no gross instability. There are no rashes, ulcerations or lesions. Strength and tone are normal.    Radiology:     X-rays obtained and reviewed in office:  Views 3-4  Location left hip  Impression well-maintained articular cartilage space with no evidence of fracture or dislocation. Views:4  Location right knee  Impression: Well-maintained articular cartilage with no evidence of acute fracture, dislocation. Assessment : Left hip bursitis, snapping IT band syndrome  Right knee iliotibial band syndrome at the knee, rule out lateral meniscus tear    Impression:  Encounter Diagnoses   Name Primary?  Pain of both hip joints Yes    Right knee pain, unspecified chronicity     It band syndrome, unspecified laterality     Patellofemoral syndrome of right knee     Snapping hip syndrome, left        Office Procedures:  Orders Placed This Encounter   Procedures    XR HIP 3-4 VW W PELVIS BILATERAL     Standing Status:   Future     Number of Occurrences:   1     Standing Expiration Date:   8/3/2021    XR KNEE RIGHT (MIN 4 VIEWS)     Standing Status:   Future     Number of Occurrences:   1     Standing Expiration Date:   8/3/2021   Neil Arita PT - [facility]     Referral Priority:   Routine     Referral Type:   Eval and Treat     Referral Reason:   Specialty Services Required     Requested Specialty:   Physical Therapy     Number of Visits Requested:   1       Treatment Plan: We reviewed the diagnosis and treatment options. We recommended a course of therapeutic exercises and anti-inflammatory medications.   She lives in [place] and will do a virtual visit in 3 to 4 weeks for repeat clinical exam.  If she continues to have pain, we recommended looking into an MRI probably of the knee.

## 2020-08-31 ENCOUNTER — HOSPITAL ENCOUNTER (OUTPATIENT)
Age: 25
Discharge: HOME | End: 2020-08-31
Payer: COMMERCIAL

## 2020-08-31 PROCEDURE — 83036 HEMOGLOBIN GLYCOSYLATED A1C: CPT

## 2020-08-31 PROCEDURE — 82607 VITAMIN B-12: CPT

## 2020-08-31 PROCEDURE — 36415 COLL VENOUS BLD VENIPUNCTURE: CPT

## 2020-08-31 PROCEDURE — 83540 ASSAY OF IRON: CPT

## 2020-08-31 PROCEDURE — 83921 ORGANIC ACID SINGLE QUANT: CPT

## 2020-08-31 PROCEDURE — 82728 ASSAY OF FERRITIN: CPT

## 2020-08-31 PROCEDURE — 83550 IRON BINDING TEST: CPT

## 2020-09-01 LAB
ESTIMATED AVERAGE GLUCOSE: 93.9 MG/DL
FERRITIN: 155.4 NG/ML (ref 15–150)
HBA1C MFR BLD: 4.9 %
IRON SATURATION: 30 % (ref 15–50)
IRON: 107 UG/DL (ref 37–145)
TOTAL IRON BINDING CAPACITY: 353 UG/DL (ref 260–445)
VITAMIN B-12: 321 PG/ML (ref 211–911)

## 2020-09-03 LAB — METHYLMALONIC ACID: 0.14 UMOL/L (ref 0–0.4)

## 2020-11-20 RX ORDER — PROPRANOLOL HYDROCHLORIDE 80 MG/1
80 CAPSULE, EXTENDED RELEASE ORAL DAILY
Qty: 90 CAPSULE | Refills: 3 | Status: SHIPPED | OUTPATIENT
Start: 2020-11-20 | End: 2020-11-25 | Stop reason: SDUPTHER

## 2020-11-20 NOTE — TELEPHONE ENCOUNTER
Kun Worthington calling from [facility] home order delivery states patient wants refill on  medication fluocinonide 0.05% ointment pls return call back @ [phone] to discuss

## 2020-11-20 NOTE — TELEPHONE ENCOUNTER
Refill on propranolol (INDERAL LA) 80 MG extended release capsule     Send to new pharmacy [address] [place] [address] ph. [phone]  Fax [phone]

## 2020-11-23 RX ORDER — FLUOCINONIDE 0.5 MG/G
OINTMENT TOPICAL
Qty: 30 G | Refills: 1 | Status: SHIPPED | OUTPATIENT
Start: 2020-11-23

## 2020-11-25 RX ORDER — PROPRANOLOL HYDROCHLORIDE 80 MG/1
80 CAPSULE, EXTENDED RELEASE ORAL DAILY
Qty: 90 CAPSULE | Refills: 1 | Status: SHIPPED | OUTPATIENT
Start: 2020-11-25 | End: 2021-07-06

## 2020-11-25 NOTE — TELEPHONE ENCOUNTER
Pharmacy calling. The Propranolol was sent to the wrong pharmacy. It should have gone to Send to new [address]. [phone]  Fax [phone].

## 2021-01-06 ENCOUNTER — TELEPHONE (OUTPATIENT)
Dept: FAMILY MEDICINE CLINIC | Age: 26
End: 2021-01-06

## 2021-01-11 ENCOUNTER — OFFICE VISIT (OUTPATIENT)
Dept: FAMILY MEDICINE CLINIC | Age: 26
End: 2021-01-11
Payer: COMMERCIAL

## 2021-01-11 ENCOUNTER — HOSPITAL ENCOUNTER (OUTPATIENT)
Age: 26
Discharge: HOME | End: 2021-01-11
Payer: COMMERCIAL

## 2021-01-11 VITALS
SYSTOLIC BLOOD PRESSURE: 102 MMHG | RESPIRATION RATE: 17 BRPM | TEMPERATURE: 97.8 F | HEART RATE: 76 BPM | BODY MASS INDEX: 26.69 KG/M2 | DIASTOLIC BLOOD PRESSURE: 68 MMHG | OXYGEN SATURATION: 98 % | WEIGHT: 160.2 LBS | HEIGHT: 65 IN

## 2021-01-11 DIAGNOSIS — Z00.00 ANNUAL PHYSICAL EXAM: ICD-10-CM

## 2021-01-11 DIAGNOSIS — G25.81 RLS (RESTLESS LEGS SYNDROME): ICD-10-CM

## 2021-01-11 DIAGNOSIS — Z79.899 MEDICATION MANAGEMENT: ICD-10-CM

## 2021-01-11 DIAGNOSIS — R00.0 INAPPROPRIATE SINUS NODE TACHYCARDIA: ICD-10-CM

## 2021-01-11 DIAGNOSIS — R20.8 DYSESTHESIA: ICD-10-CM

## 2021-01-11 DIAGNOSIS — Z00.00 ANNUAL PHYSICAL EXAM: Primary | ICD-10-CM

## 2021-01-11 DIAGNOSIS — I73.00 RAYNAUD'S PHENOMENON WITHOUT GANGRENE: ICD-10-CM

## 2021-01-11 LAB
A/G RATIO: 2 (ref 1.1–2.2)
ALBUMIN SERPL-MCNC: 5 G/DL (ref 3.4–5)
ALP BLD-CCNC: 56 U/L (ref 40–129)
ALT SERPL-CCNC: 22 U/L (ref 10–40)
ANION GAP SERPL CALCULATED.3IONS-SCNC: 12 MMOL/L (ref 3–16)
AST SERPL-CCNC: 19 U/L (ref 15–37)
BILIRUB SERPL-MCNC: 0.5 MG/DL (ref 0–1)
BUN BLDV-MCNC: 12 MG/DL (ref 7–20)
CALCIUM SERPL-MCNC: 9.7 MG/DL (ref 8.3–10.6)
CHLORIDE BLD-SCNC: 103 MMOL/L (ref 99–110)
CHOLESTEROL, TOTAL: 148 MG/DL (ref 0–199)
CO2: 26 MMOL/L (ref 21–32)
CREAT SERPL-MCNC: 0.7 MG/DL (ref 0.6–1.1)
GFR AFRICAN AMERICAN: >60
GFR NON-AFRICAN AMERICAN: >60
GLOBULIN: 2.5 G/DL
GLUCOSE BLD-MCNC: 84 MG/DL (ref 70–99)
HDLC SERPL-MCNC: 43 MG/DL (ref 40–60)
LDL CHOLESTEROL CALCULATED: 91 MG/DL
POTASSIUM SERPL-SCNC: 4.1 MMOL/L (ref 3.5–5.1)
SODIUM BLD-SCNC: 141 MMOL/L (ref 136–145)
TOTAL PROTEIN: 7.5 G/DL (ref 6.4–8.2)
TRIGL SERPL-MCNC: 70 MG/DL (ref 0–150)
VLDLC SERPL CALC-MCNC: 14 MG/DL

## 2021-01-11 PROCEDURE — 80053 COMPREHEN METABOLIC PANEL: CPT

## 2021-01-11 PROCEDURE — 36415 COLL VENOUS BLD VENIPUNCTURE: CPT

## 2021-01-11 PROCEDURE — 80061 LIPID PANEL: CPT

## 2021-01-11 PROCEDURE — 99214 OFFICE O/P EST MOD 30 MIN: CPT | Performed by: PHYSICIAN ASSISTANT

## 2021-01-11 ASSESSMENT — PATIENT HEALTH QUESTIONNAIRE - PHQ9
SUM OF ALL RESPONSES TO PHQ QUESTIONS 1-9: 0
SUM OF ALL RESPONSES TO PHQ QUESTIONS 1-9: 0

## 2021-01-11 NOTE — PROGRESS NOTES
[address] EXAMINATION         Date of Exam: 1/11/2021    Patient's Name: Meera Amor   Patient's YOB: 1995       Chief Complaint   Patient presents with    Annual Exam     fasting blood work       HPI: Meera Amor is a 26 y.o. female who presents for a complete preventive health medical examination. 8-9 years of leg dysesthesias and tachycardia. Sees cardiology, rheumatology and neurology. Cardiologist- diagnosed inappropriate sinus tachycardia, takes propanolol. No episodes since. . In 10/2017 syncopy. ECG and ECHO 2/2020 were normal. 14d Holter revealed a few runs of tachycardia 3/2020, negative for arrhythmia otherwise. Placed on propanolol, no issues since. Rheumatologist 2/27/2020 Dr Yaquelin Lemus Diagnosed her with Raynaud's phenomenon and Restless leg syndrome. Neurology, Dr Sabi Stone at [facility] does not believe she has small fiber neuropathy based of full workup on 11/16/2020. All autonomic tests were negative. Recommended gabapentin 300 mg and Vit B12 1000 mcg/d. Follow Up with neuro July 2021    Depression: not seen therapist on over a year. Not on meds. Preventive Care:   Health Maintenance   Topic Date Due    HPV vaccine (1 - 2-dose series) 02/27/2007    Varicella vaccine (2 of 2 - 2-dose childhood series) 12/11/2009    DTaP/Tdap/Td vaccine (3 - Tdap) 02/27/2015    Chlamydia screen  07/25/2020    Cervical cancer screen  07/25/2022    Meningococcal (ACWY) vaccine  Completed    Flu vaccine  Completed    Hepatitis C screen  Completed    HIV screen  Completed    Hepatitis A vaccine  Aged Out    Hepatitis B vaccine  Aged Out    Hib vaccine  Aged Out    Pneumococcal 0-64 years Vaccine  Aged Out       Last eye exam:    2020  Hearing concerns: No  Last Dental exam:    Every 6 Months    Caffeine use:  0/ day  Exercise:  three times a week, includes: pilates. Diet: admits is healthy.   Alcohol use: Yes   Tobacco/ Vapping use:  No         Cognition/ Mini Mental; concerns about forgetfulness?    no  Mental Health; concerns of anxiety or depression? yes - stable. no meds     Perform routine skin checks? Yes, sees Dr Derek Das, dermatology. Perform monthly self breast exams? Yes  Last Mammo:   not done   Last gyn exam:    6 mo ago dr Martin Tong at [facility]. DEXA: na    Colonoscopy:  na    Seatbelt use: Yes  Living will: unknown        REVIEW OF SYSTEMS:    Pertinent positive and negatives are in HPI. The remaining 14 ROS were reviewed and are unremarkable for other constitutional, EENT, cardiac, pulmonary, GI, , neurologic, musculoskeletal, or integumentary complaints.     PROBLEM LIST:  Patient Active Problem List   Diagnosis    Syncope and collapse    Dizziness    Fatigue    Sore throat and laryngitis    Strep pharyngitis    Mononucleosis    Neuralgia    Raynauds phenomenon    RLS (restless legs syndrome)       PAST MEDICAL HISTORY:      Diagnosis Date    Depression     sees therapist 2x per month (1/2019)    Syncope and collapse     Syncope and collapse        Past Surgical History:   Procedure Laterality Date    WISDOM TOOTH EXTRACTION  07/2016       Family History   Problem Relation Age of Onset    Other Mother     Rheumatologic Disease Mother     Mental Illness Father     Asthma Father     High Cholesterol Maternal Grandfather     High Blood Pressure Maternal Grandfather     Other Paternal Uncle     Asthma Brother     High Blood Pressure Maternal Grandmother     High Cholesterol Maternal Grandmother     Rheum Arthritis Maternal Grandmother     Diabetes Paternal Grandfather         Social History     Tobacco Use    Smoking status: Never Smoker    Smokeless tobacco: Never Used   Substance Use Topics    Alcohol use: Yes     Comment: on weekends        ALLERGIES:    Amoxicillin, Eucrisa [crisaborole], and Metoprolol    MEDICATIONS:  Current Outpatient Medications   Medication Sig Dispense Refill    propranolol (INDERAL LA) 80 MG extended release capsule Take 1 capsule by mouth daily Needs labs for more refills 90 capsule 1    fluocinonide (LIDEX) 0.05 % ointment Apply sparingly to affected area(s) bid prn for flares, up to 2 weeks at a time on any given area. Do not apply on cleared skin. 30 g 1    pimecrolimus (ELIDEL) 1 % cream Apply to affected \"sensitive\" areas bid prn flares. 30 g 1    levonorgestrel (MIRENA) 20 MCG/24HR IUD by Intrauterine route      gabapentin (NEURONTIN) 100 MG capsule Take 100 mg by mouth 3 times daily. prn       No current facility-administered medications for this visit. PHYSICAL EXAM:     Wt Readings from Last 3 Encounters:   01/11/21 160 lb 3.2 oz (72.7 kg)   08/03/20 150 lb (68 kg)   05/05/20 156 lb (70.8 kg)     BP Readings from Last 3 Encounters:   01/11/21 102/68   05/05/20 94/60   02/10/20 126/78     Vitals:    01/11/21 0739   BP: 102/68   Site: Right Upper Arm   Position: Sitting   Cuff Size: Medium Adult   Pulse: 76   Resp: 17   Temp: 97.8 °F (36.6 °C)   TempSrc: Temporal   SpO2: 98%   Weight: 160 lb 3.2 oz (72.7 kg)   Height: 5' 5\" (1.651 m)       Body mass index is 26.66 kg/m². GENERAL APPEARANCE:  Pleasant, friendly. Well-nourished. Looks in no distress. HEENT: Normocephalic. Atraumatic. EYES: Vision intact. EOMI, PERRLA. Conjunctivae pink, moist. Sclera white. Fundoscopic without hemorrhages or edema. Cornea and lens without opacities. EARS: Auricles symmetrical without lesions or deformities. Canals are clear. TM's intact bilaterally. Hearing  intact. NOSE: No septal deviation. Nares have pink mucosa without lesions or discharge. MOUTH/THROAT:  Lips without lesions or cracking. Teeth intact without obvious caries. Buccal mucosa - moist. Throat -without erythema, edema, or exudates. Tongue Vernette Cyphers- midline. NECK: No lymphadenopathy. Thyroid smooth, not enlarged. Spine non-tender and full range of motion without pain.   LUNGS: Clear to auscultation, no wheezes, rales, or rhonci. Equal resonance to chest percussion bilaterally. CHEST/SPINE: No skin changes or chest wall deformities. No CVAT. No spine or paraspinal muscle tenderness or deformities. Full range of motion in all planes. BREAST:   Deferred    HEART:  Regular rate and rhythm. No murmurs, rubs, or gallops. VASCULAR:  No jugular venous distension or carotid bruits. Pulses 2+ and symmetrical to carotid, femoral, and dorsalis pedis. Capillary refill <3secs. ABDOMEN: Without scars. Soft, non-tender, normoactive bowel sounds. No pulsatile masses or hepatosplenome   EXTREMITIES: No skin or bony deformities. No new erythema, edema, or exudates. Symmetrical strength. Full range of motion without eliciting pain. Sensation and DTR's are equal and intact. Vascular is intact. NEUROLOGIC: Grossly non focal. Cranial Nerves II-XII intact. Negative Rhomberg. SKIN: Warm, dry and intact. No rashes, petechia, purpura. No suspicious lesions. No nail clubbing or cyanosis. PSYCHIATRIC:  Answers and understands questions appropriately. Normal affect, behavior,  and mood. ADDITIONAL DATA:  Prior clinic visit notes, labs, and imaging reports were reviewed. Lipid panel:   Lab Results   Component Value Date    TRIG 43 01/09/2019    HDL 54 01/09/2019    LDLCALC 76 01/09/2019        ASSESSMENT & PLAN:         1. Annual physical exam  -     Comprehensive Metabolic Panel; Future  -     Lipid Panel; Future  -Medical records updated today.   -Instructed on routine monthly skin and breast exams.   - Discussed 3 most important preventive health measures     2. Raynaud's phenomenon        RLS (restless legs syndrome)      Dysesthesia   On gabapentin and followed by Neurologist.  Has seen Dr Severiano Nuñez as well. 3. Depression  No longer sees therapist. Stable per patient    4. Sinus tachycardia  On propanol and followed by cardiology. POTS ruled out.   Follow Up yearly with Dr Betsy Castellanos

## 2021-01-11 NOTE — PATIENT INSTRUCTIONS
Healthy Living Recommendations:  -Exercise 150 minutes per week to include aerobic and weights.  -Food intake to include more plant based and whole grains. Avoid raw sugar. Avoid greasy foods.  -Eye exam every 2 years after age 36.   -Dental exam every 6 months.

## 2021-01-18 ENCOUNTER — TELEPHONE (OUTPATIENT)
Dept: CARDIOLOGY CLINIC | Age: 26
End: 2021-01-18

## 2021-01-18 NOTE — TELEPHONE ENCOUNTER
----- Message from Shelly Becerra MD sent at 1/11/2021  8:28 PM EST -----  Please let her know lipid panel normal.  Thanks.

## 2021-01-18 NOTE — TELEPHONE ENCOUNTER
Attempted to call patient concerning lipid labs per AGK. No answer. Unable to leave [facility] as B is full.

## 2021-06-11 ENCOUNTER — TELEPHONE (OUTPATIENT)
Dept: CARDIOLOGY CLINIC | Age: 26
End: 2021-06-11

## 2021-06-11 NOTE — TELEPHONE ENCOUNTER
Attempted to call pt. No answer, was not able to leave a message, due to mail box being full. Per [address] [place],[address] OV 6/16/20 if pt is to continue propanolol is to fu in I yr with either PATRICK or MARCE NP PRN.        TY

## 2021-07-03 DIAGNOSIS — R00.0 SINUS TACHYCARDIA: ICD-10-CM

## 2021-07-06 DIAGNOSIS — R00.0 SINUS TACHYCARDIA: ICD-10-CM

## 2021-07-06 RX ORDER — PROPRANOLOL HYDROCHLORIDE 80 MG/1
CAPSULE, EXTENDED RELEASE ORAL
Qty: 30 CAPSULE | Refills: 0 | Status: SHIPPED | OUTPATIENT
Start: 2021-07-06 | End: 2021-07-06

## 2021-07-06 RX ORDER — PROPRANOLOL HYDROCHLORIDE 80 MG/1
CAPSULE, EXTENDED RELEASE ORAL
Qty: 90 CAPSULE | Refills: 3 | Status: SHIPPED | OUTPATIENT
Start: 2021-07-06 | End: 2021-07-25 | Stop reason: SDUPTHER

## 2021-07-06 NOTE — TELEPHONE ENCOUNTER
Last OV: 6/16/20  Next OV: needs apt   Most recent Labs: 1/11/21  Last PT/INR (if needed):  Last EKG (if needed):

## 2021-07-06 NOTE — TELEPHONE ENCOUNTER
Last ov 6/16/20  Pending appt overdue for apt  Last refill 11/25/20 #90x1  Last labs 1/11/21      My chart message sent to pt of needing an apt

## 2021-07-25 ENCOUNTER — PATIENT MESSAGE (OUTPATIENT)
Dept: CARDIOLOGY CLINIC | Age: 26
End: 2021-07-25

## 2021-07-25 DIAGNOSIS — R00.0 SINUS TACHYCARDIA: ICD-10-CM

## 2021-07-26 NOTE — TELEPHONE ENCOUNTER
BMP 1/2021. Last visit 6/2020. Med pending with 0 refills. Informed patient to make appt for further refills.

## 2021-07-26 NOTE — TELEPHONE ENCOUNTER
From: Deirdre Mackay  To: Cem Canales MD  Sent: 7/25/2021 11:27 AM EDT  Subject: Non-Urgent Medical Question    Hi dr. Vincent Fraga,   I received a call saying I needed to get blood work done to continue getting my propranolol but I already had that done in [address] [place] [address] for the year? Im also going out of the country on Friday and was wondering if I can get another month of propranolol regardless so that I have some while Im away for my trip.      Thank ya, thanks

## 2021-07-27 RX ORDER — PROPRANOLOL HYDROCHLORIDE 80 MG/1
80 CAPSULE, EXTENDED RELEASE ORAL DAILY
Qty: 90 CAPSULE | Refills: 0 | Status: SHIPPED | OUTPATIENT
Start: 2021-07-27 | End: 2021-10-08 | Stop reason: SDUPTHER

## 2021-09-27 ENCOUNTER — PATIENT MESSAGE (OUTPATIENT)
Dept: CARDIOLOGY CLINIC | Age: 26
End: 2021-09-27

## 2021-09-27 NOTE — LETTER
[address] Cardiology - [address]  Phone: [phone]  Fax: [phone]    Ann Stanton MD     Re: Lucero Moi 1995 October 5, 2021    To whom it may concern,     Patient is low risk for MACE during low risk procedure. Please call our office if you have any questions.     Sincerely,        Ann Stanton MD

## 2021-10-05 NOTE — PROGRESS NOTES
10/8/2021    TELEHEALTH EVALUATION -- Telephone (During [place] [address] public health emergency)    HPI: Any Asher is a 23 y.o. female with a history of syncope, inappropriate sinus tachycardia, POTS,RLS and Raynaud's. In , she had a tilt table test. Symptoms were not consistent with POTS. In 2017, she was evaluated for syncope. In 2020, she had a normal echocardiogram. In 3/2020, she wore a monitor that was consistent with inappropriate sinus tachycardia. In 2021, she was evaluated at [facility] dysautonomia clinic. Testing was diagnostic for POTS. QSART was abnormal and consistent with postganglionic sympathetic sudomotor abnormality like that seen in autonomic or small fiber neuropathy. Echo showed an EF of 61%. Any Asher (:  1995) has requested an telephone evaluation for the following concern(s): POTS, syncope dizziness     She is feeling well. She has occasional symptoms of POTS. Episodes occur every few weeks and are associated with dizziness, nausea and lethargy. She has not had any more syncopal episodes as she lays down at the onset of symptoms. She has more severe episodes every few months. When these occur she typically remains in bed for the day. Denies chest pain, palpitations, shortness of breath, and dizziness. She has an upcoming LEEP procedure scheduled. Review of Systems: All 14-point review of systems are completed and pertinent positives are mentioned in the history of present illness. Other systems are reviewed and are negative. Prior to Visit Medications    Medication Sig Taking? Authorizing Provider   propranolol (INDERAL LA) 80 MG extended release capsule Take 1 capsule by mouth daily Needs appointment for refills  Wang Araujo MD   fluocinonide (LIDEX) 0.05 % ointment Apply sparingly to affected area(s) bid prn for flares, up to 2 weeks at a time on any given area. Do not apply on cleared skin.   Manuel Conrad MD   pimecrolimus (ELIDEL) 1 % cream Apply to affected \"sensitive\" areas bid prn flares. Mickie Ferreira MD   gabapentin (NEURONTIN) 100 MG capsule Take 100 mg by mouth 3 times daily. prn  Historical Provider, MD   levonorgestrel (MIRENA) 20 MCG/24HR IUD by Intrauterine route  Historical Provider, MD       Allergies   Allergen Reactions    Amoxicillin     Eucrisa [Crisaborole] Other (See Comments)     Caused severe burning reaction.  Metoprolol      Feet felt like they were burning       Past Medical History:   Diagnosis Date    Depression     sees therapist 2x per month (1/2019)    Syncope and collapse     Syncope and collapse        Past Surgical History:   Procedure Laterality Date    WISDOM TOOTH EXTRACTION  07/2016       Social History     Tobacco Use    Smoking status: Never Smoker    Smokeless tobacco: Never Used   Vaping Use    Vaping Use: Never used   Substance Use Topics    Alcohol use: Yes     Comment: on weekends    Drug use: No        Family History   Problem Relation Age of Onset    Other Mother     Rheumatologic Disease Mother     Mental Illness Father     Asthma Father     High Cholesterol Maternal Grandfather     High Blood Pressure Maternal Grandfather     Other Paternal Uncle     Asthma Brother     High Blood Pressure Maternal Grandmother     High Cholesterol Maternal Grandmother     Rheum Arthritis Maternal Grandmother     Diabetes Paternal Grandfather        PHYSICAL EXAMINATION:  [ INSTRUCTIONS:  \"[x]\" Indicates a positive item  \"[]\" Indicates a negative item  -- DELETE ALL ITEMS NOT EXAMINED]  Vital Signs: (As obtained by patient/caregiver)    Blood pressure- N/A Heart rate- N/A       Physical exam unable to be performed due to telephone encounter.   Mental status  [x] Alert and awake  [x] Oriented to person/place/time          Psychiatric:       [x] Normal Affect [] No Hallucinations        [] Abnormal-       Assessment:   Inappropriate sinus tachycardia: stable  POTS: ongoing but stable -positive tilt table test at [facility] 2021  Syncope: stable, not recurrent  Raynaud's  Restless leg syndrome   Autonomic/small fiber neuropathy: diagnosed at [facility]. Continue propanolol   2. Okay for upcoming LEEP procedure/surgery from a cardiology perspective. 3. Patient currently resides in [facility]. We are happy to continue to see her here at our [place] office, however if in person visits are a challenge, patient may consider transferring care to a more local electrophysiology office. 4. Follow up in 6 months or sooner if needed        Edmundo Mak is a 23 y.o. female being evaluated by a Telephone Visit encounter to address concerns as mentioned above. A caregiver was present when appropriate. Due to this being a TeleHealth encounter (During [facility]- public health emergency), evaluation of the following organ systems was limited. Pursuant to the emergency declaration under the [address] [place] [address], [address] [place] [address] authority and the [employer] and [employer] Act, this Telephone Visit was conducted with patient's (and/or legal guardian's) consent, to reduce the patient's risk of exposure to COVID-19 and provide necessary medical care. The patient (and/or legal guardian) has also been advised to contact this office for worsening conditions or problems, and seek emergency medical treatment and/or call 911 if deemed necessary. Services were provided through a telephone to substitute for in-person clinic visit. Time spent on telephone call was 12 minutes. --ALFONZO Mejia - CNP on 10/8/2021 at 12:04 PM    An electronic signature was used to authenticate this note.

## 2021-10-08 ENCOUNTER — VIRTUAL VISIT (OUTPATIENT)
Dept: CARDIOLOGY CLINIC | Age: 26
End: 2021-10-08
Payer: COMMERCIAL

## 2021-10-08 DIAGNOSIS — G90.A POTS (POSTURAL ORTHOSTATIC TACHYCARDIA SYNDROME): ICD-10-CM

## 2021-10-08 DIAGNOSIS — R00.0 SINUS TACHYCARDIA: ICD-10-CM

## 2021-10-08 PROCEDURE — 99442 PR PHYS/QHP TELEPHONE EVALUATION 11-20 MIN: CPT | Performed by: NURSE PRACTITIONER

## 2021-10-08 RX ORDER — PROPRANOLOL HYDROCHLORIDE 80 MG/1
80 CAPSULE, EXTENDED RELEASE ORAL DAILY
Qty: 90 CAPSULE | Refills: 3 | Status: SHIPPED | OUTPATIENT
Start: 2021-10-08 | End: 2021-11-03

## 2021-10-08 NOTE — LETTER
Ila Kaiden Giorgi  1995    10/8/2021    TELEHEALTH EVALUATION -- Telephone (During [place] [address] public health emergency)    HPI: Estevan Stevenson is a 23 y.o. female with a history of syncope, inappropriate sinus tachycardia, POTS,RLS and Raynaud's. In , she had a tilt table test. Symptoms were not consistent with POTS. In 2017, she was evaluated for syncope. In 2020, she had a normal echocardiogram. In 3/2020, she wore a monitor that was consistent with inappropriate sinus tachycardia. In 2021, she was evaluated at [facility]onom[facility] clinic. Testing was diagnostic for POTS. QSART was abnormal and consistent with postganglionic sympathetic sudomotor abnormality like that seen in autonomic or small fiber neuropathy. Echo showed an EF of 61%. Estevan Stevenson (:  1995) has requested an telephone evaluation for the following concern(s): POTS, syncope dizziness     She is feeling well. She has occasional symptoms of POTS. Episodes occur every few weeks and are associated with dizziness, nausea and lethargy. She has not had any more syncopal episodes as she lays down at the onset of symptoms. She has more severe episodes every few months. When these occur she typically remains in bed for the day. Denies chest pain, palpitations, shortness of breath, and dizziness. She has an upcoming LEEP procedure scheduled. Review of Systems: All 14-point review of systems are completed and pertinent positives are mentioned in the history of present illness. Other systems are reviewed and are negative. Prior to Visit Medications    Medication Sig Taking? Authorizing Provider   propranolol (INDERAL LA) 80 MG extended release capsule Take 1 capsule by mouth daily Needs appointment for refills  Candelario Frank MD   fluocinonide (LIDEX) 0.05 % ointment Apply sparingly to affected area(s) bid prn for flares, up to 2 weeks at a time on any given area. Do not apply on cleared skin. Lisa Santos MD   pimecrolimus (ELIDEL) 1 % cream Apply to affected \"sensitive\" areas bid prn flares. Lisa Santos MD   gabapentin (NEURONTIN) 100 MG capsule Take 100 mg by mouth 3 times daily. prn  Historical Provider, MD   levonorgestrel (MIRENA) 20 MCG/24HR IUD by Intrauterine route  Historical Provider, MD       Allergies   Allergen Reactions    Amoxicillin     Eucrisa [Crisaborole] Other (See Comments)     Caused severe burning reaction.  Metoprolol      Feet felt like they were burning       Past Medical History:   Diagnosis Date    Depression     sees therapist 2x per month (1/2019)    Syncope and collapse     Syncope and collapse        Past Surgical History:   Procedure Laterality Date    WISDOM TOOTH EXTRACTION  07/2016       Social History     Tobacco Use    Smoking status: Never Smoker    Smokeless tobacco: Never Used   Vaping Use    Vaping Use: Never used   Substance Use Topics    Alcohol use: Yes     Comment: on weekends    Drug use: No        Family History   Problem Relation Age of Onset    Other Mother     Rheumatologic Disease Mother     Mental Illness Father     Asthma Father     High Cholesterol Maternal Grandfather     High Blood Pressure Maternal Grandfather     Other Paternal Uncle     Asthma Brother     High Blood Pressure Maternal Grandmother     High Cholesterol Maternal Grandmother     Rheum Arthritis Maternal Grandmother     Diabetes Paternal Grandfather        PHYSICAL EXAMINATION:  [ INSTRUCTIONS:  \"[x]\" Indicates a positive item  \"[]\" Indicates a negative item  -- DELETE ALL ITEMS NOT EXAMINED]  Vital Signs: (As obtained by patient/caregiver)    Blood pressure- N/A Heart rate- N/A       Physical exam unable to be performed due to telephone encounter.   Mental status  [x] Alert and awake  [x] Oriented to person/place/time          Psychiatric:       [x] Normal Affect [] No Hallucinations        [] Abnormal-       Assessment:   Inappropriate sinus tachycardia: stable  POTS: ongoing but stable    -positive tilt table test at [facility] 2021  Syncope: stable, not recurrent  Raynaud's  Restless leg syndrome   Autonomic/small fiber neuropathy: diagnosed at [facility]. Continue propanolol   2. Okay for upcoming LEEP procedure/surgery from a cardiology perspective. 3. Patient currently resides in [place]. We are happy to continue to see her here at our [place] office, however if in person visits are a challenge, patient may consider transferring care to a more local electrophysiology office. 4. Follow up in 6 months or sooner if needed        Benita Smith is a 23 y.o. female being evaluated by a Telephone Visit encounter to address concerns as mentioned above. A caregiver was present when appropriate. Due to this being a TeleHealth encounter (During  public health emergency), evaluation of the following organ systems was limited. Pursuant to the emergency declaration under the [address] [place] [address], [address] [place] [address] authority and the [employer] and [employer] Act, this Telephone Visit was conducted with patient's (and/or legal guardian's) consent, to reduce the patient's risk of exposure to COVID-19 and provide necessary medical care. The patient (and/or legal guardian) has also been advised to contact this office for worsening conditions or problems, and seek emergency medical treatment and/or call 911 if deemed necessary. Services were provided through a telephone to substitute for in-person clinic visit. Time spent on telephone call was 12 minutes. --ALFONZO Mac CNP on 10/8/2021 at 12:04 PM    An electronic signature was used to authenticate this note.

## 2021-10-11 NOTE — PROGRESS NOTES
[facility]    Age 23 y.o.    female    1995    MRN [id number]    10/19/2021  Arrival Time_____________  OR Time____________[address] [employer]     Procedure(s):  LOOP ELECTROSURGICAL EXCISION PROCEDURE                      General    Surgeon(s):  Carolynne Oppenheim, MD       Phone [phone] ([place])     [address]  Cell         Work  _____________________________________________________________________  _____________________________________________________________________  _____________________________________________________________________  _____________________________________________________________________  _____________________________________________________________________    PCP _____________________________ Phone_________________     H&P__________________Bringing      Chart            Epic   DOS      Called________  EKG__________________Bringing      Chart            Epic   DOS      Called________  LAB__________________ Bringing      Chart            Epic   DOS      Called________  Cardiac Clearance_______Bringing      Chart            Epic      DOS      Called________    Cardiologist________________________ Phone___________________________    ? Zoroastrianism concerns / Waiver on Chart            [employer]________________  ? Pre-op Instructions Given [place]          _________________________________  ? Directions to Surgery Center                          _________________________________  ? Transportation Home_______________      __________________________________  ?  Crutches/Walker__________________        __________________________________    ________Pre-op Orders   _______Transcribed    _______Wt.  ________Pharmacy          _______SCD  ______VTE     ______TED Veverly Ralphs  _______  Surgery Consent    _______  Anesthesia Consent         COVID DATE______________LOCATION________________ RESULT__________

## 2021-10-12 NOTE — PROGRESS NOTES
Preoperative Screening for Elective Surgery/Invasive Procedures While COVID-19 present in the community     Have you had any of the following symptoms? No  o Fever, chills  o Cough  o Shortness of breath  o Muscle aches/pain  o Diarrhea  o Abdominal pain, nausea, vomiting  o Loss or decrease in taste and / or smell   Risk of Exposure  o Have you recently been hospitalized for COVID-19 or flu-like illness, if so when? No  o Recently diagnosed with COVID-19, if so when? No  o Recently tested for COVID-19, if so when? Aug 2021  o Have you been in close contact with a person or family member who currently has or recently had COVID-23? If yes, when and in what context? No  o Do you live with anybody who in the last 14 days has had fever, chills, shortness of breath, muscle aches, flu-like illness? No  o Do you have any close contacts or family members who are currently in the hospital for COVID-19 or flu-like illness? No  If yes, assess recent close contact with this person. Indicate if the patient has a positive screen by answering yes to one or more of the above questions. Patients who test positive or screen positive prior to surgery or on the day of surgery should be evaluated in conjunction with the surgeon/proceduralist/anesthesiologist to determine the urgency of the procedure.

## 2021-10-14 ENCOUNTER — ANESTHESIA EVENT (OUTPATIENT)
Dept: OPERATING ROOM | Age: 26
End: 2021-10-14
Payer: COMMERCIAL

## 2021-10-19 ENCOUNTER — HOSPITAL ENCOUNTER (OUTPATIENT)
Age: 26
Setting detail: OUTPATIENT SURGERY
Discharge: HOME | End: 2021-10-19
Attending: OBSTETRICS & GYNECOLOGY | Admitting: OBSTETRICS & GYNECOLOGY
Payer: COMMERCIAL

## 2021-10-19 ENCOUNTER — ANESTHESIA (OUTPATIENT)
Dept: OPERATING ROOM | Age: 26
End: 2021-10-19
Payer: COMMERCIAL

## 2021-10-19 VITALS
SYSTOLIC BLOOD PRESSURE: 116 MMHG | TEMPERATURE: 97.7 F | WEIGHT: 155 LBS | HEIGHT: 65 IN | RESPIRATION RATE: 15 BRPM | OXYGEN SATURATION: 100 % | BODY MASS INDEX: 25.83 KG/M2 | HEART RATE: 77 BPM | DIASTOLIC BLOOD PRESSURE: 68 MMHG

## 2021-10-19 VITALS
DIASTOLIC BLOOD PRESSURE: 31 MMHG | SYSTOLIC BLOOD PRESSURE: 89 MMHG | OXYGEN SATURATION: 98 % | TEMPERATURE: 96.8 F | RESPIRATION RATE: 15 BRPM

## 2021-10-19 DIAGNOSIS — N87.0 MILD CERVICAL DYSPLASIA: ICD-10-CM

## 2021-10-19 PROBLEM — N87.9 CERVICAL DYSPLASIA: Status: RESOLVED | Noted: 2021-10-19 | Resolved: 2021-10-19

## 2021-10-19 PROBLEM — N87.9 CERVICAL DYSPLASIA: Status: ACTIVE | Noted: 2021-10-19

## 2021-10-19 LAB
EKG ATRIAL RATE: 75 BPM
EKG DIAGNOSIS: NORMAL
EKG P AXIS: 26 DEGREES
EKG P-R INTERVAL: 194 MS
EKG Q-T INTERVAL: 372 MS
EKG QRS DURATION: 80 MS
EKG QTC CALCULATION (BAZETT): 415 MS
EKG R AXIS: 58 DEGREES
EKG T AXIS: 28 DEGREES
EKG VENTRICULAR RATE: 75 BPM
PREGNANCY, URINE: NEGATIVE
SARS-COV-2, NAAT: NOT DETECTED

## 2021-10-19 PROCEDURE — 3600000004 HC SURGERY LEVEL 4 BASE: Performed by: OBSTETRICS & GYNECOLOGY

## 2021-10-19 PROCEDURE — 84703 CHORIONIC GONADOTROPIN ASSAY: CPT

## 2021-10-19 PROCEDURE — 2500000003 HC RX 250 WO HCPCS: Performed by: NURSE ANESTHETIST, CERTIFIED REGISTERED

## 2021-10-19 PROCEDURE — 2709999900 HC NON-CHARGEABLE SUPPLY: Performed by: OBSTETRICS & GYNECOLOGY

## 2021-10-19 PROCEDURE — 3600000014 HC SURGERY LEVEL 4 ADDTL 15MIN: Performed by: OBSTETRICS & GYNECOLOGY

## 2021-10-19 PROCEDURE — 93005 ELECTROCARDIOGRAM TRACING: CPT | Performed by: ANESTHESIOLOGY

## 2021-10-19 PROCEDURE — 6370000000 HC RX 637 (ALT 250 FOR IP): Performed by: NURSE ANESTHETIST, CERTIFIED REGISTERED

## 2021-10-19 PROCEDURE — 3700000000 HC ANESTHESIA ATTENDED CARE: Performed by: OBSTETRICS & GYNECOLOGY

## 2021-10-19 PROCEDURE — 3700000001 HC ADD 15 MINUTES (ANESTHESIA): Performed by: OBSTETRICS & GYNECOLOGY

## 2021-10-19 PROCEDURE — 93010 ELECTROCARDIOGRAM REPORT: CPT | Performed by: INTERNAL MEDICINE

## 2021-10-19 PROCEDURE — 7100000000 HC PACU RECOVERY - FIRST 15 MIN: Performed by: OBSTETRICS & GYNECOLOGY

## 2021-10-19 PROCEDURE — 87635 SARS-COV-2 COVID-19 AMP PRB: CPT

## 2021-10-19 PROCEDURE — 6370000000 HC RX 637 (ALT 250 FOR IP): Performed by: OBSTETRICS & GYNECOLOGY

## 2021-10-19 PROCEDURE — 2580000003 HC RX 258: Performed by: OBSTETRICS & GYNECOLOGY

## 2021-10-19 PROCEDURE — 6360000002 HC RX W HCPCS: Performed by: NURSE ANESTHETIST, CERTIFIED REGISTERED

## 2021-10-19 PROCEDURE — 88307 TISSUE EXAM BY PATHOLOGIST: CPT

## 2021-10-19 PROCEDURE — 2580000003 HC RX 258: Performed by: ANESTHESIOLOGY

## 2021-10-19 PROCEDURE — 7100000001 HC PACU RECOVERY - ADDTL 15 MIN: Performed by: OBSTETRICS & GYNECOLOGY

## 2021-10-19 PROCEDURE — 2500000003 HC RX 250 WO HCPCS: Performed by: OBSTETRICS & GYNECOLOGY

## 2021-10-19 PROCEDURE — 7100000010 HC PHASE II RECOVERY - FIRST 15 MIN: Performed by: OBSTETRICS & GYNECOLOGY

## 2021-10-19 PROCEDURE — 7100000011 HC PHASE II RECOVERY - ADDTL 15 MIN: Performed by: OBSTETRICS & GYNECOLOGY

## 2021-10-19 PROCEDURE — 6360000002 HC RX W HCPCS: Performed by: ANESTHESIOLOGY

## 2021-10-19 RX ORDER — DIPHENHYDRAMINE HYDROCHLORIDE 50 MG/ML
6.25 INJECTION INTRAMUSCULAR; INTRAVENOUS
Status: DISCONTINUED | OUTPATIENT
Start: 2021-10-19 | End: 2021-10-19 | Stop reason: HOSPADM

## 2021-10-19 RX ORDER — MIDAZOLAM HYDROCHLORIDE 1 MG/ML
INJECTION INTRAMUSCULAR; INTRAVENOUS PRN
Status: DISCONTINUED | OUTPATIENT
Start: 2021-10-19 | End: 2021-10-19 | Stop reason: SDUPTHER

## 2021-10-19 RX ORDER — SODIUM CHLORIDE 0.9 % (FLUSH) 0.9 %
10 SYRINGE (ML) INJECTION EVERY 12 HOURS SCHEDULED
Status: DISCONTINUED | OUTPATIENT
Start: 2021-10-19 | End: 2021-10-19 | Stop reason: HOSPADM

## 2021-10-19 RX ORDER — LABETALOL HYDROCHLORIDE 5 MG/ML
5 INJECTION, SOLUTION INTRAVENOUS
Status: DISCONTINUED | OUTPATIENT
Start: 2021-10-19 | End: 2021-10-19 | Stop reason: HOSPADM

## 2021-10-19 RX ORDER — SODIUM CHLORIDE, SODIUM LACTATE, POTASSIUM CHLORIDE, CALCIUM CHLORIDE 600; 310; 30; 20 MG/100ML; MG/100ML; MG/100ML; MG/100ML
INJECTION, SOLUTION INTRAVENOUS CONTINUOUS
Status: DISCONTINUED | OUTPATIENT
Start: 2021-10-19 | End: 2021-10-19 | Stop reason: HOSPADM

## 2021-10-19 RX ORDER — LIDOCAINE HYDROCHLORIDE 10 MG/ML
1 INJECTION, SOLUTION EPIDURAL; INFILTRATION; INTRACAUDAL; PERINEURAL
Status: DISCONTINUED | OUTPATIENT
Start: 2021-10-19 | End: 2021-10-19 | Stop reason: HOSPADM

## 2021-10-19 RX ORDER — HYDRALAZINE HYDROCHLORIDE 20 MG/ML
5 INJECTION INTRAMUSCULAR; INTRAVENOUS EVERY 30 MIN PRN
Status: DISCONTINUED | OUTPATIENT
Start: 2021-10-19 | End: 2021-10-19 | Stop reason: HOSPADM

## 2021-10-19 RX ORDER — OXYCODONE HYDROCHLORIDE AND ACETAMINOPHEN 5; 325 MG/1; MG/1
1 TABLET ORAL PRN
Status: DISCONTINUED | OUTPATIENT
Start: 2021-10-19 | End: 2021-10-19 | Stop reason: HOSPADM

## 2021-10-19 RX ORDER — MAGNESIUM HYDROXIDE 1200 MG/15ML
LIQUID ORAL CONTINUOUS PRN
Status: COMPLETED | OUTPATIENT
Start: 2021-10-19 | End: 2021-10-19

## 2021-10-19 RX ORDER — IODINE SOLUTION STRONG 5% (LUGOL'S) 5 %
SOLUTION ORAL PRN
Status: DISCONTINUED | OUTPATIENT
Start: 2021-10-19 | End: 2021-10-19 | Stop reason: ALTCHOICE

## 2021-10-19 RX ORDER — SODIUM CHLORIDE 9 MG/ML
25 INJECTION, SOLUTION INTRAVENOUS PRN
Status: DISCONTINUED | OUTPATIENT
Start: 2021-10-19 | End: 2021-10-19 | Stop reason: HOSPADM

## 2021-10-19 RX ORDER — PROPOFOL 10 MG/ML
INJECTION, EMULSION INTRAVENOUS PRN
Status: DISCONTINUED | OUTPATIENT
Start: 2021-10-19 | End: 2021-10-19 | Stop reason: SDUPTHER

## 2021-10-19 RX ORDER — SCOLOPAMINE TRANSDERMAL SYSTEM 1 MG/1
PATCH, EXTENDED RELEASE TRANSDERMAL
Status: COMPLETED
Start: 2021-10-19 | End: 2021-10-19

## 2021-10-19 RX ORDER — ONDANSETRON 2 MG/ML
4 INJECTION INTRAMUSCULAR; INTRAVENOUS EVERY 30 MIN PRN
Status: DISCONTINUED | OUTPATIENT
Start: 2021-10-19 | End: 2021-10-19 | Stop reason: HOSPADM

## 2021-10-19 RX ORDER — MEPERIDINE HYDROCHLORIDE 50 MG/ML
12.5 INJECTION INTRAMUSCULAR; INTRAVENOUS; SUBCUTANEOUS EVERY 5 MIN PRN
Status: DISCONTINUED | OUTPATIENT
Start: 2021-10-19 | End: 2021-10-19 | Stop reason: HOSPADM

## 2021-10-19 RX ORDER — LIDOCAINE HYDROCHLORIDE 20 MG/ML
INJECTION, SOLUTION EPIDURAL; INFILTRATION; INTRACAUDAL; PERINEURAL PRN
Status: DISCONTINUED | OUTPATIENT
Start: 2021-10-19 | End: 2021-10-19 | Stop reason: SDUPTHER

## 2021-10-19 RX ORDER — DEXAMETHASONE SODIUM PHOSPHATE 10 MG/ML
INJECTION INTRAMUSCULAR; INTRAVENOUS PRN
Status: DISCONTINUED | OUTPATIENT
Start: 2021-10-19 | End: 2021-10-19 | Stop reason: SDUPTHER

## 2021-10-19 RX ORDER — FENTANYL CITRATE 50 UG/ML
INJECTION, SOLUTION INTRAMUSCULAR; INTRAVENOUS PRN
Status: DISCONTINUED | OUTPATIENT
Start: 2021-10-19 | End: 2021-10-19 | Stop reason: SDUPTHER

## 2021-10-19 RX ORDER — ONDANSETRON 2 MG/ML
INJECTION INTRAMUSCULAR; INTRAVENOUS PRN
Status: DISCONTINUED | OUTPATIENT
Start: 2021-10-19 | End: 2021-10-19 | Stop reason: SDUPTHER

## 2021-10-19 RX ORDER — SODIUM CHLORIDE 0.9 % (FLUSH) 0.9 %
10 SYRINGE (ML) INJECTION PRN
Status: DISCONTINUED | OUTPATIENT
Start: 2021-10-19 | End: 2021-10-19 | Stop reason: HOSPADM

## 2021-10-19 RX ORDER — OXYCODONE HYDROCHLORIDE AND ACETAMINOPHEN 5; 325 MG/1; MG/1
2 TABLET ORAL PRN
Status: DISCONTINUED | OUTPATIENT
Start: 2021-10-19 | End: 2021-10-19 | Stop reason: HOSPADM

## 2021-10-19 RX ORDER — SODIUM CHLORIDE, SODIUM LACTATE, POTASSIUM CHLORIDE, CALCIUM CHLORIDE 600; 310; 30; 20 MG/100ML; MG/100ML; MG/100ML; MG/100ML
INJECTION, SOLUTION INTRAVENOUS ONCE
Status: COMPLETED | OUTPATIENT
Start: 2021-10-19 | End: 2021-10-19

## 2021-10-19 RX ORDER — APREPITANT 40 MG/1
40 CAPSULE ORAL ONCE
Status: COMPLETED | OUTPATIENT
Start: 2021-10-19 | End: 2021-10-19

## 2021-10-19 RX ORDER — ACETIC ACID 0.25 G/100ML
IRRIGANT IRRIGATION PRN
Status: DISCONTINUED | OUTPATIENT
Start: 2021-10-19 | End: 2021-10-19 | Stop reason: ALTCHOICE

## 2021-10-19 RX ORDER — LIDOCAINE HYDROCHLORIDE AND EPINEPHRINE BITARTRATE 20; .01 MG/ML; MG/ML
INJECTION, SOLUTION SUBCUTANEOUS PRN
Status: DISCONTINUED | OUTPATIENT
Start: 2021-10-19 | End: 2021-10-19 | Stop reason: ALTCHOICE

## 2021-10-19 RX ORDER — SCOLOPAMINE TRANSDERMAL SYSTEM 1 MG/1
PATCH, EXTENDED RELEASE TRANSDERMAL PRN
Status: DISCONTINUED | OUTPATIENT
Start: 2021-10-19 | End: 2021-10-19 | Stop reason: SDUPTHER

## 2021-10-19 RX ORDER — KETOROLAC TROMETHAMINE 30 MG/ML
INJECTION, SOLUTION INTRAMUSCULAR; INTRAVENOUS PRN
Status: DISCONTINUED | OUTPATIENT
Start: 2021-10-19 | End: 2021-10-19 | Stop reason: SDUPTHER

## 2021-10-19 RX ADMIN — PROPOFOL 100 MG: 10 INJECTION, EMULSION INTRAVENOUS at 08:37

## 2021-10-19 RX ADMIN — MIDAZOLAM HYDROCHLORIDE 2 MG: 2 INJECTION, SOLUTION INTRAMUSCULAR; INTRAVENOUS at 08:33

## 2021-10-19 RX ADMIN — SODIUM CHLORIDE, POTASSIUM CHLORIDE, SODIUM LACTATE AND CALCIUM CHLORIDE: 600; 310; 30; 20 INJECTION, SOLUTION INTRAVENOUS at 07:19

## 2021-10-19 RX ADMIN — DEXAMETHASONE SODIUM PHOSPHATE 8 MG: 10 INJECTION INTRAMUSCULAR; INTRAVENOUS at 08:36

## 2021-10-19 RX ADMIN — SCOPALAMINE 1 PATCH: 1 PATCH, EXTENDED RELEASE TRANSDERMAL at 08:30

## 2021-10-19 RX ADMIN — LIDOCAINE HYDROCHLORIDE 60 MG: 20 INJECTION, SOLUTION EPIDURAL; INFILTRATION; INTRACAUDAL; PERINEURAL at 08:36

## 2021-10-19 RX ADMIN — SODIUM CHLORIDE, POTASSIUM CHLORIDE, SODIUM LACTATE AND CALCIUM CHLORIDE: 600; 310; 30; 20 INJECTION, SOLUTION INTRAVENOUS at 08:28

## 2021-10-19 RX ADMIN — FENTANYL CITRATE 25 MCG: 50 INJECTION INTRAMUSCULAR; INTRAVENOUS at 08:50

## 2021-10-19 RX ADMIN — PROPOFOL 200 MG: 10 INJECTION, EMULSION INTRAVENOUS at 08:36

## 2021-10-19 RX ADMIN — SODIUM CHLORIDE, POTASSIUM CHLORIDE, SODIUM LACTATE AND CALCIUM CHLORIDE: 600; 310; 30; 20 INJECTION, SOLUTION INTRAVENOUS at 07:20

## 2021-10-19 RX ADMIN — SODIUM CHLORIDE, POTASSIUM CHLORIDE, SODIUM LACTATE AND CALCIUM CHLORIDE: 600; 310; 30; 20 INJECTION, SOLUTION INTRAVENOUS at 09:39

## 2021-10-19 RX ADMIN — ONDANSETRON 4 MG: 2 INJECTION INTRAMUSCULAR; INTRAVENOUS at 08:36

## 2021-10-19 RX ADMIN — APREPITANT 40 MG: 40 CAPSULE ORAL at 07:20

## 2021-10-19 RX ADMIN — FENTANYL CITRATE 50 MCG: 50 INJECTION INTRAMUSCULAR; INTRAVENOUS at 08:36

## 2021-10-19 RX ADMIN — FENTANYL CITRATE 25 MCG: 50 INJECTION INTRAMUSCULAR; INTRAVENOUS at 08:43

## 2021-10-19 RX ADMIN — KETOROLAC TROMETHAMINE 30 MG: 30 INJECTION, SOLUTION INTRAMUSCULAR; INTRAVENOUS at 09:10

## 2021-10-19 ASSESSMENT — PAIN SCALES - GENERAL
PAINLEVEL_OUTOF10: 0

## 2021-10-19 ASSESSMENT — PULMONARY FUNCTION TESTS
PIF_VALUE: 17
PIF_VALUE: 20
PIF_VALUE: 8
PIF_VALUE: 1
PIF_VALUE: 10
PIF_VALUE: 1
PIF_VALUE: 10
PIF_VALUE: 11
PIF_VALUE: 10
PIF_VALUE: 10
PIF_VALUE: 1
PIF_VALUE: 10
PIF_VALUE: 0
PIF_VALUE: 10
PIF_VALUE: 0
PIF_VALUE: 10
PIF_VALUE: 0
PIF_VALUE: 12
PIF_VALUE: 10
PIF_VALUE: 14
PIF_VALUE: 10
PIF_VALUE: 1
PIF_VALUE: 10
PIF_VALUE: 15
PIF_VALUE: 10
PIF_VALUE: 10
PIF_VALUE: 2

## 2021-10-19 ASSESSMENT — PAIN - FUNCTIONAL ASSESSMENT: PAIN_FUNCTIONAL_ASSESSMENT: 0-10

## 2021-10-19 NOTE — ANESTHESIA POSTPROCEDURE EVALUATION
Department of Anesthesiology  Postprocedure Note    Patient: Nereyda Bejarano  MRN: [id number]  YOB: 1995  Date of evaluation: 10/19/2021  Time:  2:17 PM     Procedure Summary     Date: 10/19/21 Room / Location: [address] [place] [address]    Anesthesia Start: 8061 Anesthesia Stop: 0926    Procedure: LOOP ELECTROSURGICAL EXCISION PROCEDURE (N/A Vagina ) Diagnosis:       Mild cervical dysplasia      (Mild cervical dysplasia [N87.0])    Surgeons: Keith Telles MD Responsible Provider: Joaquín Perez MD    Anesthesia Type: general ASA Status: 2          Anesthesia Type: general    Cara Phase I: Cara Score: 10    Cara Phase II: Cara Score: 10    Last vitals: Reviewed and per EMR flowsheets.        Anesthesia Post Evaluation    Comments: Postoperative Anesthesia Note    Name:    Nereyda Bejarano  MRN:      [id number]    Patient Vitals in the past 12 hrs:  10/19/21 1030, BP:116/68, Pulse:77, Resp:15, SpO2:100 %  10/19/21 1015, BP:115/63, Pulse:75, Resp:15, SpO2:99 %  10/19/21 1005, BP:121/71, Pulse:78, Resp:18, SpO2:100 %  10/19/21 1000, BP:122/67, Pulse:79, Resp:15, SpO2:100 %  10/19/21 0955, BP:121/75, Pulse:80, Resp:18, SpO2:100 %  10/19/21 0950, BP:112/62, Pulse:83, Resp:15, SpO2:100 %  10/19/21 0945, BP:(!) 108/58, Pulse:85, Resp:12, SpO2:100 %  10/19/21 0940, BP:(!) 112/57, Temp:97.7 °F (36.5 °C), Temp src:Temporal, Pulse:80, Resp:11, SpO2:100 %  10/19/21 0935, BP:(!) 105/57, Pulse:81, Resp:14, SpO2:99 %  10/19/21 0930, BP:(!) 97/52, Pulse:73, Resp:12, SpO2:96 %  10/19/21 0925, BP:(!) 84/28, Pulse:72, Resp:11, SpO2:95 %  10/19/21 0920, BP:(!) 81/31, Temp:97.3 °F (36.3 °C), Temp src:Temporal, Pulse:73, Resp:18, SpO2:95 %  10/19/21 0709, BP:124/80, Temp:98.5 °F (36.9 °C), Temp src:Temporal, Pulse:81, Resp:18, SpO2:99 %, Height:5' 5\" (1.651 m), Weight:155 lb (70.3 kg)     LABS:    CBC  Lab Results       Component                Value               Date/Time WBC                      8.5                 02/27/2020 04:09 PM        HGB                      14.0                02/27/2020 04:09 PM        HCT                      41.5                02/27/2020 04:09 PM        PLT                      234                 02/27/2020 04:09 PM   RENAL  Lab Results       Component                Value               Date/Time                  NA                       141                 01/11/2021 08:49 AM        K                        4.1                 01/11/2021 08:49 AM        CL                       103                 01/11/2021 08:49 AM        CO2                      26                  01/11/2021 08:49 AM        BUN                      12                  01/11/2021 08:49 AM        CREATININE               0.7                 01/11/2021 08:49 AM        GLUCOSE                  84                  01/11/2021 08:49 AM   COAGS  Lab Results       Component                Value               Date/Time                  PROTIME                  11.9                03/12/2014 09:58 AM        INR                      1.06                03/12/2014 09:58 AM     Intake & Output:  In: 2000 (I.V.:2000)  Out: -     Nausea & Vomiting:  No    Level of Consciousness:  Awake    Pain Assessment:  Adequate analgesia    Anesthesia Complications:  No apparent anesthetic complications    SUMMARY      Vital signs stable  OK to discharge from Stage I post anesthesia care.   Care transferred from Anesthesiology department on discharge from perioperative area

## 2021-10-19 NOTE — OP NOTE
[address], [place] [address]                                OPERATIVE REPORT    PATIENT NAME: Thu Baptiste                  :        1995  MED REC NO:   [id number]                          ROOM:  ACCOUNT NO:   [de-identified]                           ADMIT DATE: 10/19/2021  PROVIDER:     Virginie Breen MD    DATE OF PROCEDURE:  10/19/2021    PREOPERATIVE DIAGNOSIS:  Mild cervical dysplasia, persistent greater  than 2 years. POSTOPERATIVE DIAGNOSIS:  Mild cervical dysplasia, persistent greater  than 2 years. OPERATION PERFORMED:  Colposcopy with loop electrical excision  procedure. SURGEON:  Virginie Breen MD    ASSISTANT:  None. ANESTHESIA:  General laryngeal mask airway by Art Cortes CRNA and Dr. Shawna Real:  Minimal.    COMPLICATIONS:  None. SPECIMENS:  Anterior and posterior ectocervical specimen with the  posterior specimen in three layers. Endocervical specimen to a 3 mm  depth. IMPLANTS:  None. DRAINS:  None. FINDINGS:  Cervix with aceto-white lesion circumferentially at the  T-zone and mosaicism at the 6 and 10 o'clock positions. IUD strings  were visible, unable to remain intact throughout the procedure. Anterior and posterior ectocervical specimens taken and a single  endocervical specimen as well. DESCRIPTION OF OPERATION:  The patient is a 78-year-old white female  with low-grade changes for greater than 2 years on Pap smears. 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 There were no abnormal vessels seen previously  nor today although there was some friability of the cervix present  today. Because of the persistence of this low-grade change on biopsy  with mild dysplasia, a recommendation for LEEP procedure was made. The  patient has Mirena in place and our intention was to push the IUD  strings further up in the canal due to LEEP and then tease them back  down which was accomplished today. The morning of surgery, the  patient's EKG and labs were all reviewed and normal.  Questions were  answered and she agreed to proceed. OPERATIVE PROCEDURE:  The patient was taken to the OR. She was placed  in supine position and general anesthesia administered. Once under  anesthesia, the patient's legs  were placed in [address] [place] [address] and her  perineum and distal third of the vagina prepped. Sterile towels were  used to drape the vagina. A time-out was accomplished and then, a  small-coated grave speculum was placed for cervical visualizations and  opened as wide as it could for good visualization and _____ for the  LEEP. The colposcope was utilized to visualize the cervix and its  abnormalities under saline, acetic acid and finally Lugol's. Lugol's  clearly identified the borders of the dysplasia. 1% lidocaine with  epinephrine was then instilled circumferentially at the cervix and after  giving its sometime to set up. We then proceeded with the LEEP. A 2.0  x 0.8 cm loop was utilized to remove the posterior aspect of the T-zone. Due to superficial specimen, another layer was removed and in the 4  o'clock position where the leukoplakia was. One small additional  specimen was obtained from there as well and one single anterior  specimen was taken of the anterior T-zone and then, a shallow sample of  the endocervix was removed as well and sent separately. The base of the  cervix was coagulated with ball tip for excellent hemostasis ensuring  any dysplastic tissue left behind at the margins at the cervix  especially were cauterized. The IUD strings were teased back down out  of the endocervix and only Monsel's was placed at the endocervix for  excellent hemostasis. This concluded the procedure.   The patient had no  significant bleeding from the cervix at the conclusion of the procedure  nor on visualization for an additional five minutes. Her legs were then  taken down from stirrups. She was awoken from anesthesia and brought to  the recovery room in good condition.         Nae Cornejo MD    D: 10/19/2021 9:28:09       T: 10/19/2021 13:12:26     [email or web address]  Job#: [id number]     Doc#: [id number]    CC:

## 2021-10-19 NOTE — PROGRESS NOTES
Up to bathroom with assist voided without difficulty.  No vaginal drainage noted and new rosalba pad applied

## 2021-10-19 NOTE — BRIEF OP NOTE
Brief Postoperative Note      Patient: Glenis Valdez  YOB: 1995  MRN: [id number]    Date of Procedure: 10/19/2021    Pre-Op Diagnosis: Mild cervical dysplasia [N87.0]    Post-Op Diagnosis: Same       Procedure(s):  LOOP ELECTROSURGICAL EXCISION PROCEDURE    Surgeon(s):  Khalif Singh MD    Assistant:  * No surgical staff found *    Anesthesia: General, LMA, Laverne Nugent CRNA and Dr. José Daivla    Estimated Blood Loss (mL): Minimal    Complications: None    Specimens:   ID Type Source Tests Collected by Time Destination   A : ectocervical anterior Tissue Tissue SURGICAL PATHOLOGY Khalif Singh MD 10/19/2021 0900    B : ectocervical posterior Tissue Tissue SURGICAL PATHOLOGY Khalif Singh MD 10/19/2021 0901    C : endocervical Tissue Tissue SURGICAL PATHOLOGY Khalif Singh MD 10/19/2021 2333        Implants:  * No implants in log *      Drains: * No LDAs found *    Findings: Cervix with acetowhite lesion circumferentially at the T-zone; mosaicism at 6 and 10 'clock; IUD strings visible and able to remain intact throughout procedure. Anterior and posterior ectocervical specimens (posterior in 3 layers) and single endocervical 3mm specimen.     Dict#: [id number]    Electronically signed by Khalif Singh MD on 10/19/2021 at 9:14 AM

## 2021-10-19 NOTE — ANESTHESIA PRE PROCEDURE
Department of Anesthesiology  Preprocedure Note       Name:  Nereyda Bejarano   Age:  23 y.o.  :  1995                                          MRN:  [id number]         Date:  10/19/2021      Surgeon: Yoan Yung):  Keith Telles MD    Procedure: Procedure(s):  LOOP ELECTROSURGICAL EXCISION PROCEDURE    Medications prior to admission:   Prior to Admission medications    Medication Sig Start Date End Date Taking? Authorizing Provider   propranolol (INDERAL LA) 80 MG extended release capsule Take 1 capsule by mouth daily Needs appointment for refills 10/8/21  Yes ALFONZO Moseley - CNP   fluocinonide (LIDEX) 0.05 % ointment Apply sparingly to affected area(s) bid prn for flares, up to 2 weeks at a time on any given area. Do not apply on cleared skin. 20  Yes Lisa Santos MD   pimecrolimus (ELIDEL) 1 % cream Apply to affected \"sensitive\" areas bid prn flares. 20  Yes Lisa Santos MD   gabapentin (NEURONTIN) 100 MG capsule Take 100 mg by mouth as needed.      Historical Provider, MD   levonorgestrel (MIRENA) 20 MCG/24HR IUD by Intrauterine route    Historical Provider, MD       Current medications:    Current Facility-Administered Medications   Medication Dose Route Frequency Provider Last Rate Last Admin    lactated ringers infusion   IntraVENous Continuous Meenu Malone MD        sodium chloride flush 0.9 % injection 10 mL  10 mL IntraVENous 2 times per day Meenu Malone MD        sodium chloride flush 0.9 % injection 10 mL  10 mL IntraVENous PRN Meenu Malone MD        0.9 % sodium chloride infusion  25 mL IntraVENous PRN Meenu Malone MD        lidocaine PF 1 % injection 1 mL  1 mL IntraDERmal Once PRN Meenu Malone MD        lactated ringers infusion   IntraVENous Continuous Ivonne Berman MD        lidocaine 1 % (PF) injection 0.1 mL  0.1 mL IntraDERmal Once PRN Ivonne Berman MD        sodium chloride flush 0.9 % injection 10 mL  10 mL IntraVENous 2 times per day Dulce Pineda MD        meperidine (DEMEROL) injection 12.5 mg  12.5 mg IntraVENous Q5 Min PRN Yury Galarza MD        HYDROmorphone (DILAUDID) injection 0.5 mg  0.5 mg IntraVENous Q10 Min PRN Yury Galarza MD        HYDROmorphone (DILAUDID) injection 0.5 mg  0.5 mg IntraVENous Q5 Min PRN Yury Galarza MD        oxyCODONE-acetaminophen (PERCOCET) 5-325 MG per tablet 1 tablet  1 tablet Oral PRN Yury Galarza MD        Or    oxyCODONE-acetaminophen (PERCOCET) 5-325 MG per tablet 2 tablet  2 tablet Oral PRN Yury Galarza MD        ondansetron [facility]) injection 4 mg  4 mg IntraVENous Q30 Min PRN Yury Galarza MD        diphenhydrAMINE (BENADRYL) injection 6.25 mg  6.25 mg IntraVENous Once PRN Yury Galarza MD        labetalol (NORMODYNE;TRANDATE) injection 5 mg  5 mg IntraVENous Q15 Min PRN Yury Galarza MD        hydrALAZINE (APRESOLINE) injection 5 mg  5 mg IntraVENous Q30 Min PRN Yury Galarza MD           Allergies: Allergies   Allergen Reactions    Amoxicillin     Eucrisa [Crisaborole] Other (See Comments)     Caused severe burning reaction.     Food      Pomegranate and kiwi    Metoprolol      Feet felt like they were burning       Problem List:    Patient Active Problem List   Diagnosis Code    Syncope and collapse R55    Dizziness R42    Fatigue R53.83    Sore throat and laryngitis J06.0    Strep pharyngitis J02.0    Mononucleosis B27.90    Neuralgia M79.2    Raynauds phenomenon I73.00    RLS (restless legs syndrome) G25.81    Inappropriate sinus node tachycardia R00.0    Dysesthesia R20.8    POTS (postural orthostatic tachycardia syndrome) I49.8       Past Medical History:        Diagnosis Date    Depression     sees therapist 2x per month (1/2019)    PONV (postoperative nausea and vomiting)     POTS (postural orthostatic tachycardia syndrome)     Raynaud's disease     Syncope and collapse        Past Surgical History:        Procedure Laterality Date    WISDOM TOOTH EXTRACTION  07/2016       Social History:    Social History     Tobacco Use    Smoking status: Never Smoker    Smokeless tobacco: Never Used   Substance Use Topics    Alcohol use: Yes     Comment: 3-4 drinks per week                                Counseling given: Not Answered      Vital Signs (Current):   Vitals:    10/12/21 1001 10/19/21 0709   BP:  124/80   Pulse:  81   Resp:  18   Temp:  98.5 °F (36.9 °C)   TempSrc:  Temporal   SpO2:  99%   Weight: 156 lb (70.8 kg) 155 lb (70.3 kg)   Height: 5' 5\" (1.651 m) 5' 5\" (1.651 m)                                              BP Readings from Last 3 Encounters:   10/19/21 124/80   01/11/21 102/68   05/05/20 94/60       NPO Status: Time of last liquid consumption: 2100                        Time of last solid consumption: 2100                        Date of last liquid consumption: 10/18/21                        Date of last solid food consumption: 10/18/21    BMI:   Wt Readings from Last 3 Encounters:   10/19/21 155 lb (70.3 kg)   01/11/21 160 lb 3.2 oz (72.7 kg)   08/03/20 150 lb (68 kg)     Body mass index is 25.79 kg/m². CBC:   Lab Results   Component Value Date    WBC 8.5 02/27/2020    RBC 4.53 02/27/2020    HGB 14.0 02/27/2020    HCT 41.5 02/27/2020    MCV 91.7 02/27/2020    RDW 12.7 02/27/2020     02/27/2020       CMP:   Lab Results   Component Value Date     01/11/2021    K 4.1 01/11/2021     01/11/2021    CO2 26 01/11/2021    BUN 12 01/11/2021    CREATININE 0.7 01/11/2021    GFRAA >60 01/11/2021    AGRATIO 2.0 01/11/2021    LABGLOM >60 01/11/2021    GLUCOSE 84 01/11/2021    PROT 7.5 01/11/2021    CALCIUM 9.7 01/11/2021    BILITOT 0.5 01/11/2021    ALKPHOS 56 01/11/2021    AST 19 01/11/2021    ALT 22 01/11/2021       POC Tests: No results for input(s): POCGLU, POCNA, POCK, POCCL, POCBUN, POCHEMO, POCHCT in the last 72 hours.     Coags:   Lab Results Component Value Date    PROTIME 11.9 03/12/2014    INR 1.06 03/12/2014       HCG (If Applicable):   Lab Results   Component Value Date    PREGTESTUR Negative 10/19/2021        ABGs: No results found for: PHART, PO2ART, WHM3RDD, VUX5VTO, BEART, C1LPAYLA     Type & Screen (If Applicable):  No results found for: LABABO, LABRH    Drug/Infectious Status (If Applicable):  No results found for: HIV, HEPCAB    COVID-19 Screening (If Applicable):   Lab Results   Component Value Date    COVID19 Not Detected 10/19/2021           Anesthesia Evaluation  Patient summary reviewed and Nursing notes reviewed   history of anesthetic complications: PONV. Airway: Mallampati: II     Neck ROM: full   Dental:          Pulmonary:                              Cardiovascular:                   ROS comment: POTS     Neuro/Psych:   (+) psychiatric history:            GI/Hepatic/Renal:             Endo/Other:                     Abdominal:             Vascular: Other Findings:             Anesthesia Plan      general     ASA 2     (Medications & allergies reviewed  All available lab & EKG data reviewed)  Induction: intravenous. Anesthetic plan and risks discussed with patient. Plan discussed with CRNA.                   Lorna Carpenter MD   10/19/2021

## 2021-11-02 DIAGNOSIS — R00.0 SINUS TACHYCARDIA: ICD-10-CM

## 2021-11-03 RX ORDER — PROPRANOLOL HYDROCHLORIDE 80 MG/1
CAPSULE, EXTENDED RELEASE ORAL
Qty: 90 CAPSULE | Refills: 2 | Status: SHIPPED | OUTPATIENT
Start: 2021-11-03 | End: 2022-02-08

## 2022-01-04 PROBLEM — G60.9 IDIOPATHIC SMALL FIBER PERIPHERAL NEUROPATHY: Status: ACTIVE | Noted: 2021-01-30

## 2022-01-04 PROBLEM — R68.89 DECREASED EXERCISE TOLERANCE: Status: ACTIVE | Noted: 2021-01-30

## 2022-01-04 PROBLEM — I73.00 RAYNAUDS PHENOMENON: Status: RESOLVED | Noted: 2020-02-27 | Resolved: 2022-01-04

## 2022-01-04 PROBLEM — R20.2 PARESTHESIA OF SKIN: Status: ACTIVE | Noted: 2021-05-16

## 2022-01-04 PROBLEM — R29.818: Status: ACTIVE | Noted: 2021-01-30

## 2022-01-04 PROBLEM — R42 ORTHOSTATIC LIGHTHEADEDNESS: Status: ACTIVE | Noted: 2021-01-30

## 2022-01-04 PROBLEM — R42 POSTURAL DIZZINESS WITH PRESYNCOPE: Status: ACTIVE | Noted: 2021-01-30

## 2022-01-04 PROBLEM — R55 POSTURAL DIZZINESS WITH PRESYNCOPE: Status: ACTIVE | Noted: 2021-01-30

## 2022-01-04 PROBLEM — R20.9 DISTURBANCE OF SKIN SENSATION: Status: ACTIVE | Noted: 2021-01-30

## 2022-01-04 PROBLEM — R55 VASOMOTOR INSTABILITY: Status: ACTIVE | Noted: 2021-01-30

## 2022-01-04 PROBLEM — I87.2 CHRONIC VENOUS INSUFFICIENCY: Status: ACTIVE | Noted: 2021-05-16

## 2022-01-04 RX ORDER — GABAPENTIN 300 MG/1
300 CAPSULE ORAL 3 TIMES DAILY
COMMUNITY
Start: 2021-01-28

## 2022-02-08 DIAGNOSIS — R00.0 SINUS TACHYCARDIA: ICD-10-CM

## 2022-02-08 RX ORDER — PROPRANOLOL HYDROCHLORIDE 80 MG/1
CAPSULE, EXTENDED RELEASE ORAL
Qty: 90 CAPSULE | Refills: 0 | Status: SHIPPED | OUTPATIENT
Start: 2022-02-08 | End: 2022-08-31 | Stop reason: SDUPTHER

## 2022-08-30 ENCOUNTER — PATIENT MESSAGE (OUTPATIENT)
Dept: CARDIOLOGY CLINIC | Age: 27
End: 2022-08-30

## 2022-08-30 DIAGNOSIS — R00.0 SINUS TACHYCARDIA: ICD-10-CM

## 2022-08-30 NOTE — TELEPHONE ENCOUNTER
From: Ilia Darling  To: Gudelia Singleton  Sent: 8/30/2022 4:03 PM EDT  Subject: changing pharmacy to online pharmacy    Hello,   I am wondering if my propranalol er 80 mg monthly prescription can be switched from [facility]# [id number] [address] number : [phone]).      Thanks,

## 2022-08-31 RX ORDER — PROPRANOLOL HYDROCHLORIDE 80 MG/1
CAPSULE, EXTENDED RELEASE ORAL
Qty: 60 CAPSULE | Refills: 0 | Status: SHIPPED | OUTPATIENT
Start: 2022-08-31

## 2023-05-01 DIAGNOSIS — R00.0 SINUS TACHYCARDIA: ICD-10-CM

## 2023-05-02 RX ORDER — PROPRANOLOL HYDROCHLORIDE 80 MG/1
CAPSULE, EXTENDED RELEASE ORAL
Qty: 90 CAPSULE | Refills: 0 | Status: SHIPPED | OUTPATIENT
Start: 2023-05-02

## 2023-05-02 NOTE — TELEPHONE ENCOUNTER
05/02/23-Called & spoke with pt. Pt is now scheduled for 07/11/23 with [facility].  Routing back to practice staff for refill approval.

## (undated) DEVICE — ELECTRODE LOOP 10X10MM SHFT L5IN DIA3/32IN STD LEEP LLETZ

## (undated) DEVICE — PENCIL ES L3M BTTN SWCH S STL HEX LOK BLDE ELECTRD HOLSTER

## (undated) DEVICE — TUBING SMK EVAC L10FT OD7/8IN L BOR W/ N COND COAT

## (undated) DEVICE — TRAY PROC INTRACERVICAL LEEP DISPOSABLE REDIKIT

## (undated) DEVICE — ELECTRODE BALL DIA5MM TUNGSTEN LLETZ DURABLE RESIST

## (undated) DEVICE — PAD,NON-ADHERENT,3X8,STERILE,LF,1/PK: Brand: MEDLINE

## (undated) DEVICE — SKIN MARKER,REGULAR TIP WITH RULER AND LABELS: Brand: DEVON

## (undated) DEVICE — TRAY PREP DRY W/ PREM GLV 2 APPL 6 SPNG 2 UNDPD 1 OVERWRAP

## (undated) DEVICE — SWAB PROCTOSCOPIC ST 8 IN

## (undated) DEVICE — SOLUTION IV IRRIG 500ML 0.9% SODIUM CHL 2F7123

## (undated) DEVICE — DRAPE,UNDERBUTTOCKS,PCH,STERILE: Brand: MEDLINE

## (undated) DEVICE — TOWEL,OR,DSP,ST,BLUE,STD,4/PK,20PK/CS: Brand: MEDLINE

## (undated) DEVICE — SET ADMIN PRIMING 7ML L30IN 7.35LB 20 GTT 2ND RLER CLMP

## (undated) DEVICE — GLOVE SURG SZ 65 L12IN FNGR THK75MIL WHT LTX POLYMER BEAD

## (undated) DEVICE — SET GRAV VENT NVENT CK VLV 3 NDL FREE PRT 10 GTT

## (undated) DEVICE — ELECTRODE PT RET AD L9FT HI MOIST COND ADH HYDRGEL CORDED

## (undated) DEVICE — CATHETER IV 20GA L1.25IN PNK FEP SFTY STR HUB RADPQ DISP

## (undated) DEVICE — GLOVE SURG SZ 65 L12IN FNGR THK94MIL STD WHT LTX FREE

## (undated) DEVICE — GAUZE,SPONGE,4"X4",16PLY,XRAY,STRL,LF: Brand: MEDLINE

## (undated) DEVICE — SOLUTION IV 1000ML LAC RINGERS PH 6.5 INJ USP VIAFLX PLAS

## (undated) DEVICE — ELECTRODE ES L W2CM D0.8CM SHFT L12CM CONN L3/32IN TUNGSTEN

## (undated) DEVICE — 3M™ TEGADERM™ TRANSPARENT FILM DRESSING FRAME STYLE, 1624W, 2-3/8 IN X 2-3/4 IN (6 CM X 7 CM), 100/CT 4CT/CASE: Brand: 3M™ TEGADERM™

## (undated) DEVICE — 6" COTTON-TIPPED STERILE APPLICATOR: Brand: MCKESSON